# Patient Record
Sex: MALE | Race: WHITE | URBAN - METROPOLITAN AREA
[De-identification: names, ages, dates, MRNs, and addresses within clinical notes are randomized per-mention and may not be internally consistent; named-entity substitution may affect disease eponyms.]

---

## 2019-10-08 ENCOUNTER — OFFICE (OUTPATIENT)
Dept: URBAN - METROPOLITAN AREA PATHOLOGY 4 | Facility: PATHOLOGY | Age: 74
End: 2019-10-08

## 2019-10-08 ENCOUNTER — AMBULATORY SURGICAL CENTER (OUTPATIENT)
Dept: URBAN - METROPOLITAN AREA SURGERY 10 | Facility: SURGERY | Age: 74
End: 2019-10-08
Payer: MEDICARE

## 2019-10-08 DIAGNOSIS — Z80.9 FAMILY HISTORY OF MALIGNANT NEOPLASM, UNSPECIFIED: ICD-10-CM

## 2019-10-08 DIAGNOSIS — D12.4 BENIGN NEOPLASM OF DESCENDING COLON: ICD-10-CM

## 2019-10-08 DIAGNOSIS — Z86.010 PERSONAL HISTORY OF COLONIC POLYPS: ICD-10-CM

## 2019-10-08 DIAGNOSIS — K64.1 SECOND DEGREE HEMORRHOIDS: ICD-10-CM

## 2019-10-08 PROCEDURE — 45385 COLONOSCOPY W/LESION REMOVAL: CPT | Mod: PT | Performed by: INTERNAL MEDICINE

## 2019-10-08 PROCEDURE — 88305 TISSUE EXAM BY PATHOLOGIST: CPT | Performed by: INTERNAL MEDICINE

## 2020-10-01 ENCOUNTER — TELEPHONE (OUTPATIENT)
Dept: ENDOCRINOLOGY | Facility: CLINIC | Age: 75
End: 2020-10-01

## 2020-10-01 NOTE — TELEPHONE ENCOUNTER
PATIENT NEEDS REFILLS ON VICTOZA MEDICATION. HE HAS LESS THEN A MONTH SUPPLY OF MEDICATION. HE NEEDS TO GET VICOTOZA 1.8 AND FOR NOVOLIN 70/30. HE WAS GETTING THEM FOR FREE AND WANTS TO CONTINUE GETTING THEM FOR FREE.  PATIENTS NUMBER [phone]. HE WOULD LIKE A CALL ABOUT THIS MESSAGE.

## 2020-10-06 NOTE — TELEPHONE ENCOUNTER
ash pt. And advised pt. Assist. Paperwork was faxed in on 9/29/2020 and he should be hearing from them soon, he will call [facility] pt. Assit. If he doesn't hear anything soon. KHALIDA

## 2020-10-15 NOTE — TELEPHONE ENCOUNTER
Patient assistance came in today. Notified patient of medications in office. He verbalized understanding and had no further questions.

## 2020-12-22 ENCOUNTER — OFFICE VISIT (OUTPATIENT)
Dept: ENDOCRINOLOGY | Facility: CLINIC | Age: 75
End: 2020-12-22

## 2020-12-22 VITALS
DIASTOLIC BLOOD PRESSURE: 58 MMHG | SYSTOLIC BLOOD PRESSURE: 118 MMHG | WEIGHT: 224.8 LBS | BODY MASS INDEX: 33.3 KG/M2 | TEMPERATURE: 97.7 F | HEIGHT: 69 IN

## 2020-12-22 DIAGNOSIS — I10 BENIGN HYPERTENSION: ICD-10-CM

## 2020-12-22 DIAGNOSIS — E11.21 TYPE 2 DIABETES MELLITUS WITH DIABETIC NEPHROPATHY, WITH LONG-TERM CURRENT USE OF INSULIN (HCC): ICD-10-CM

## 2020-12-22 DIAGNOSIS — E11.649 TYPE 2 DIABETES MELLITUS WITH HYPOGLYCEMIA WITHOUT COMA, WITH LONG-TERM CURRENT USE OF INSULIN (HCC): ICD-10-CM

## 2020-12-22 DIAGNOSIS — Z79.4 TYPE 2 DIABETES MELLITUS WITH DIABETIC NEPHROPATHY, WITH LONG-TERM CURRENT USE OF INSULIN (HCC): ICD-10-CM

## 2020-12-22 DIAGNOSIS — Z79.4 TYPE 2 DIABETES MELLITUS WITH HYPOGLYCEMIA WITHOUT COMA, WITH LONG-TERM CURRENT USE OF INSULIN (HCC): ICD-10-CM

## 2020-12-22 DIAGNOSIS — E11.65 UNCONTROLLED TYPE 2 DIABETES MELLITUS WITH HYPERGLYCEMIA (HCC): Primary | ICD-10-CM

## 2020-12-22 PROBLEM — E78.2 MIXED HYPERLIPIDEMIA: Status: ACTIVE | Noted: 2020-12-22

## 2020-12-22 PROCEDURE — 99214 OFFICE O/P EST MOD 30 MIN: CPT | Performed by: INTERNAL MEDICINE

## 2020-12-22 RX ORDER — MULTIVIT-MIN/IRON/FOLIC ACID/K 18-600-40
CAPSULE ORAL
COMMUNITY

## 2020-12-22 RX ORDER — ROSUVASTATIN CALCIUM 10 MG/1
10 TABLET, COATED ORAL DAILY
COMMUNITY
Start: 2020-12-13

## 2020-12-22 RX ORDER — MONTELUKAST SODIUM 10 MG/1
1 TABLET ORAL DAILY
COMMUNITY
Start: 2020-11-09

## 2020-12-22 RX ORDER — FENOFIBRATE 160 MG/1
1 TABLET ORAL DAILY
COMMUNITY
Start: 2020-09-28

## 2020-12-22 RX ORDER — HYDROCHLOROTHIAZIDE 50 MG/1
50 TABLET ORAL 2 TIMES DAILY
COMMUNITY
End: 2022-03-23

## 2020-12-22 RX ORDER — SEMAGLUTIDE 1.34 MG/ML
1 INJECTION, SOLUTION SUBCUTANEOUS WEEKLY
Qty: 18 ML | Refills: 3 | Status: SHIPPED | OUTPATIENT
Start: 2020-12-22 | End: 2021-02-02 | Stop reason: SDUPTHER

## 2020-12-22 RX ORDER — APIXABAN 5 MG/1
5 TABLET, FILM COATED ORAL 2 TIMES DAILY
COMMUNITY
Start: 2020-09-28

## 2020-12-22 RX ORDER — DOXAZOSIN 8 MG/1
1 TABLET ORAL DAILY
COMMUNITY
Start: 2020-10-27

## 2020-12-22 RX ORDER — NIFEDIPINE 90 MG/1
1 TABLET, EXTENDED RELEASE ORAL DAILY
COMMUNITY
Start: 2020-12-06 | End: 2022-07-05

## 2020-12-22 RX ORDER — METOLAZONE 2.5 MG/1
1 TABLET ORAL EVERY OTHER DAY
COMMUNITY
Start: 2020-12-21

## 2020-12-22 RX ORDER — CARVEDILOL 12.5 MG/1
1 TABLET ORAL 2 TIMES DAILY
COMMUNITY
Start: 2020-11-09

## 2020-12-22 RX ORDER — FLECAINIDE ACETATE 50 MG/1
1 TABLET ORAL
COMMUNITY
Start: 2020-10-14

## 2020-12-22 RX ORDER — BLOOD SUGAR DIAGNOSTIC
STRIP MISCELLANEOUS
COMMUNITY
Start: 2020-09-28 | End: 2022-08-12

## 2020-12-22 RX ORDER — FINASTERIDE 5 MG/1
1 TABLET, FILM COATED ORAL DAILY
COMMUNITY
Start: 2020-12-06

## 2020-12-22 NOTE — ASSESSMENT & PLAN NOTE
Diabetes is worsening.   Continue current treatment regimen.  Diabetes will be reassessed in 3 months.    Recent A1c was 8.1%.  He has been on prednisone recently for bronchitis.    We discussed change from victoza to ozempic.

## 2020-12-22 NOTE — PROGRESS NOTES
"     Office Note      Date: 2020  Patient Name: Timothy Chacon  MRN: [id number]  : 1945    Chief Complaint   Patient presents with   • Follow-up   • Diabetes       History of Present Illness:   Timothy Chacon is a 75 y.o. male who presents for Diabetes type 2. Diagnosed in: . Treated in past with oral agents. Current treatments: premix insulin and victoza. Number of insulin shots per day: 2. Checks blood sugar 2 times a day. Has low blood sugar: occasional. Aspirin use: No - on eliquis. Statin use: Yes. ACE-I/ARB use: No - due to hypotension. Changes in health since last visit: none. Last eye exam .    Subjective      Diabetic Complications:  Eyes: No  Kidneys: Yes -    Feet: No  Heart: No    Diet and Exercise:  Meals per day: 2  Minutes of exercise per week: 0 mins.    Review of Systems:   Review of Systems   Constitutional: Negative.    Cardiovascular: Negative.    Gastrointestinal: Negative.    Endocrine: Negative.        The following portions of the patient's history were reviewed and updated as appropriate: allergies, current medications, past family history, past medical history, past social history, past surgical history and problem list.    Objective       Visit Vitals  /58   Temp 97.7 °F (36.5 °C) (Infrared)   Ht 175.3 cm (69\")   Wt 102 kg (224 lb 12.8 oz)   BMI 33.20 kg/m²       Physical Exam:  Physical Exam  Constitutional:       Appearance: Normal appearance.   Neurological:      Mental Status: He is alert.         Labs:    HbA1c  No results found for: HGBA1C    CMP  No results found for: GLUCOSE, BUN, CREATININE, EGFRIFNONA, EGFRIFAFRI, BCR, K, CO2, CALCIUM, PROTENTOTREF, LABIL2, BILIRUBIN, AST, ALT     Lipid Panel        TSH  No results found for: TSH, FREET4     Hemoglobin A1C        Microalbumin/Creatinine  No results found for: MALBCRERATIO, CREATINIURIN, MICROALBUR        Assessment / Plan      Assessment & Plan:  Problem List Items Addressed This Visit        Cardiovascular and " Mediastinum    Benign hypertension    Current Assessment & Plan     Hypertension is unchanged.  Continue current treatment regimen.  Blood pressure will be reassessed in 3 months.         Relevant Medications    carvedilol (COREG) 12.5 MG tablet    NIFEdipine XL (PROCARDIA XL) 90 MG 24 hr tablet    metOLazone (ZAROXOLYN) 2.5 MG tablet    doxazosin (CARDURA) 8 MG tablet    hydroCHLOROthiazide (HYDRODIURIL) 50 MG tablet       Endocrine    Uncontrolled type 2 diabetes mellitus with hyperglycemia (CMS/[facility]) - Primary    Current Assessment & Plan     Diabetes is worsening.   Continue current treatment regimen.  Diabetes will be reassessed in 3 months.    Recent A1c was 8.1%.  He has been on prednisone recently for bronchitis.    We discussed change from victoza to ozempic.         Relevant Medications    Insulin Aspart Prot & Aspart (NOVOLOG MIX 70/30 FLEXPEN SC)    Semaglutide, 1 MG/DOSE, (Ozempic, 1 MG/DOSE,) 2 MG/1.5ML solution pen-injector    Type 2 diabetes mellitus with hypoglycemia without coma (CMS/[facility])    Relevant Medications    Insulin Aspart Prot & Aspart (NOVOLOG MIX 70/30 FLEXPEN SC)    Semaglutide, 1 MG/DOSE, (Ozempic, 1 MG/DOSE,) 2 MG/1.5ML solution pen-injector    Type 2 diabetes mellitus with diabetic nephropathy, with long-term current use of insulin (CMS/[facility])    Current Assessment & Plan     Continue nephrology follow up.         Relevant Medications    metOLazone (ZAROXOLYN) 2.5 MG tablet    Insulin Aspart Prot & Aspart (NOVOLOG MIX 70/30 FLEXPEN SC)    hydroCHLOROthiazide (HYDRODIURIL) 50 MG tablet    Semaglutide, 1 MG/DOSE, (Ozempic, 1 MG/DOSE,) 2 MG/1.5ML solution pen-injector           Return in about 3 months (around 3/22/2021) for Recheck with A1c.    Zac Fish MD   12/22/2020

## 2021-02-02 RX ORDER — SEMAGLUTIDE 1.34 MG/ML
INJECTION, SOLUTION SUBCUTANEOUS
Qty: 9 ML | Refills: 1 | Status: SHIPPED | OUTPATIENT
Start: 2021-02-02 | End: 2021-02-02 | Stop reason: SDUPTHER

## 2021-02-02 RX ORDER — SEMAGLUTIDE 1.34 MG/ML
INJECTION, SOLUTION SUBCUTANEOUS
Qty: 9 ML | Refills: 1 | Status: SHIPPED | OUTPATIENT
Start: 2021-02-02

## 2021-02-02 NOTE — TELEPHONE ENCOUNTER
Spoke to pt-he is no long on victozia and doesn't need the 70/30 inulin or pen needles.  He will contact [employer] to cancel.  Needs reill on ozempic.  Last visit 12/22/20  Next visit 4/6/21

## 2021-03-08 RX ORDER — BLOOD SUGAR DIAGNOSTIC
STRIP MISCELLANEOUS
Qty: 200 EACH | Refills: 1 | Status: SHIPPED | OUTPATIENT
Start: 2021-03-08 | End: 2022-07-12

## 2021-03-08 RX ORDER — BLOOD-GLUCOSE METER
1 EACH MISCELLANEOUS ONCE
Qty: 1 KIT | Refills: 0 | Status: SHIPPED | OUTPATIENT
Start: 2021-03-08 | End: 2021-03-08

## 2021-03-08 RX ORDER — BLOOD SUGAR DIAGNOSTIC
STRIP MISCELLANEOUS
Qty: 200 EACH | Refills: 1 | Status: SHIPPED | OUTPATIENT
Start: 2021-03-08 | End: 2022-03-23

## 2021-03-08 NOTE — TELEPHONE ENCOUNTER
Patient called and is having issues with his medication. He states that he's tried to order syringes twice but they've been canceled both times, and his wife ordered and got them within two weeks using the same insurance so he wants to know if we could order him Droplet Insulin Syringes, 8ml by 31 gauge 5/16th inch syringes. He needs 200.     He also needs a refill on his One Touch test strips but will need a PA. They said they would cover Accu-check test strips but that doesn't match his meter. He wants to know if we had any samples. Please advise and give him a call back.

## 2021-04-06 ENCOUNTER — TELEPHONE (OUTPATIENT)
Dept: ENDOCRINOLOGY | Facility: CLINIC | Age: 76
End: 2021-04-06

## 2021-04-06 ENCOUNTER — OFFICE VISIT (OUTPATIENT)
Dept: ENDOCRINOLOGY | Facility: CLINIC | Age: 76
End: 2021-04-06

## 2021-04-06 VITALS
BODY MASS INDEX: 43.59 KG/M2 | HEIGHT: 60 IN | SYSTOLIC BLOOD PRESSURE: 120 MMHG | OXYGEN SATURATION: 98 % | TEMPERATURE: 97.7 F | HEART RATE: 70 BPM | DIASTOLIC BLOOD PRESSURE: 50 MMHG | WEIGHT: 222 LBS

## 2021-04-06 DIAGNOSIS — I10 BENIGN HYPERTENSION: ICD-10-CM

## 2021-04-06 DIAGNOSIS — Z79.4 TYPE 2 DIABETES MELLITUS WITH HYPOGLYCEMIA WITHOUT COMA, WITH LONG-TERM CURRENT USE OF INSULIN (HCC): ICD-10-CM

## 2021-04-06 DIAGNOSIS — E78.2 MIXED HYPERLIPIDEMIA: ICD-10-CM

## 2021-04-06 DIAGNOSIS — E11.65 UNCONTROLLED TYPE 2 DIABETES MELLITUS WITH HYPERGLYCEMIA (HCC): Primary | ICD-10-CM

## 2021-04-06 DIAGNOSIS — Z79.4 INSULIN LONG-TERM USE (HCC): ICD-10-CM

## 2021-04-06 DIAGNOSIS — E11.649 TYPE 2 DIABETES MELLITUS WITH HYPOGLYCEMIA WITHOUT COMA, WITH LONG-TERM CURRENT USE OF INSULIN (HCC): ICD-10-CM

## 2021-04-06 PROCEDURE — 99214 OFFICE O/P EST MOD 30 MIN: CPT | Performed by: INTERNAL MEDICINE

## 2021-04-06 RX ORDER — SPIRONOLACTONE 50 MG/1
TABLET, FILM COATED ORAL
COMMUNITY
Start: 2021-02-23 | End: 2022-07-05

## 2021-04-06 NOTE — PROGRESS NOTES
"     Office Note      Date: 2021  Patient Name: Timothy Chacon  MRN: [id number]  : 1945    Chief Complaint   Patient presents with   • Diabetes       History of Present Illness:   Timothy Chacon is a 75 y.o. male who presents for Diabetes type 2. Diagnosed in: . Treated in past with oral agents. Current treatments: premix insulin and ozempic. Number of insulin shots per day: 2. Checks blood sugar 2 times a day. Has low blood sugar: occasional severe. Aspirin use: No - on eliquis. Statin use: Yes. ACE-I/ARB use: No - due to hypotension. Changes in health since last visit: COVID-19 infection - mild symptoms. Last eye exam .    Subjective      Diabetic Complications:  Eyes: No  Kidneys: Yes -    Feet: No  Heart: No    Diet and Exercise:  Meals per day: 2  Minutes of exercise per week: 0 mins.    Review of Systems:   Review of Systems   Constitutional: Negative.    Cardiovascular: Negative.    Gastrointestinal: Negative.    Endocrine: Negative.        The following portions of the patient's history were reviewed and updated as appropriate: allergies, current medications, past family history, past medical history, past social history, past surgical history and problem list.    Objective       Visit Vitals  /50 (BP Location: Right arm, Patient Position: Sitting, Cuff Size: Adult)   Pulse 70   Temp 97.7 °F (36.5 °C) (Infrared)   Ht 152.4 cm (60\")   Wt 101 kg (222 lb)   SpO2 98%   BMI 43.36 kg/m²       Physical Exam:  Physical Exam  Constitutional:       Appearance: Normal appearance.   Neurological:      Mental Status: He is alert.         Labs:    HbA1c  No results found for: HGBA1C    CMP  No results found for: GLUCOSE, BUN, CREATININE, EGFRIFNONA, EGFRIFAFRI, BCR, K, CO2, CALCIUM, PROTENTOTREF, LABIL2, BILIRUBIN, AST, ALT     Lipid Panel        TSH  No results found for: TSH, FREET4     Hemoglobin A1C        Microalbumin/Creatinine  No results found for: MALBCRERATIO, CREATINIURIN, " MICROALBUR        Assessment / Plan      Assessment & Plan:  Diagnoses and all orders for this visit:    1. Uncontrolled type 2 diabetes mellitus with hyperglycemia (CMS/[facility]) (Primary)  Assessment & Plan:  Diabetes is worsening. Last A1c was higher at 9.5% but this was prior to changing from victoza to ozempic.  Continue current treatment regimen.  Diabetes will be reassessed in 3 months.    FSBS show lower fasting FSBS and higher in the evening.  Decrease PM insulin dose by 5u and increase AM dose by 5u.      2. Type 2 diabetes mellitus with hypoglycemia without coma, with long-term current use of insulin (CMS/[facility])  Assessment & Plan:  Decrease PM insulin dose.      3. Benign hypertension  Assessment & Plan:  Hypertension is unchanged.  Continue current treatment regimen.  Blood pressure will be reassessed at the next regular appointment.      4. Mixed hyperlipidemia  Assessment & Plan:  Continue statin.      5. Insulin long-term use (CMS/[facility])      Return in about 3 months (around 7/6/2021) for Recheck with A1c.    Zac Fish MD   04/06/2021

## 2021-04-06 NOTE — ASSESSMENT & PLAN NOTE
Diabetes is worsening. Last A1c was higher at 9.5% but this was prior to changing from victoza to ozempic.  Continue current treatment regimen.  Diabetes will be reassessed in 3 months.    FSBS show lower fasting FSBS and higher in the evening.  Decrease PM insulin dose by 5u and increase AM dose by 5u.

## 2021-04-26 ENCOUNTER — TELEPHONE (OUTPATIENT)
Dept: ENDOCRINOLOGY | Facility: CLINIC | Age: 76
End: 2021-04-26

## 2021-04-26 NOTE — TELEPHONE ENCOUNTER
Spoke with patient.  He is going to have them fax over form to add the ozempic to previous patient assistance paperwork.

## 2021-04-26 NOTE — TELEPHONE ENCOUNTER
PATIENT CALLED REQUESTING TO SPEAK WITH SOMEONE REGARDING PATIENT ASSISTANCE. HE STATES THAT HE HAS SPOKEN WITH [employer]. HE WAS TOLD THAT THEY HAVE SENT NEW PAPERWORK TO THIS OFFICE AND HE NEEDS TO GO OVER SOME OF THE INFORMATION WITH YOU.

## 2021-05-11 ENCOUNTER — TELEPHONE (OUTPATIENT)
Dept: ENDOCRINOLOGY | Facility: CLINIC | Age: 76
End: 2021-05-11

## 2021-05-11 NOTE — TELEPHONE ENCOUNTER
Patient is calling regarding the patient assistance for [employer]. Please give him a call back when able.

## 2021-05-11 NOTE — TELEPHONE ENCOUNTER
PT CALLED. HE STATED HE SPOKE W/ [facility]. THE PT STATED THAT [employer] REQUIRES WE SEND THEM INFORMATION IN REGARDS TO THE PT'S MEDICATIONS, DOSAGE, ETC. PLEASE LOOK INTO THIS. PT SEEMED CONCERNED. THANK YOU

## 2021-06-04 ENCOUNTER — TELEPHONE (OUTPATIENT)
Dept: ENDOCRINOLOGY | Facility: CLINIC | Age: 76
End: 2021-06-04

## 2021-08-04 ENCOUNTER — MEDICATION THERAPY MANAGEMENT (OUTPATIENT)
Dept: ENDOCRINOLOGY | Facility: CLINIC | Age: 76
End: 2021-08-04

## 2021-08-04 ENCOUNTER — OFFICE VISIT (OUTPATIENT)
Dept: ENDOCRINOLOGY | Facility: CLINIC | Age: 76
End: 2021-08-04

## 2021-08-04 VITALS
DIASTOLIC BLOOD PRESSURE: 58 MMHG | SYSTOLIC BLOOD PRESSURE: 116 MMHG | HEIGHT: 69 IN | OXYGEN SATURATION: 96 % | BODY MASS INDEX: 30.07 KG/M2 | WEIGHT: 203 LBS | HEART RATE: 70 BPM

## 2021-08-04 DIAGNOSIS — Z79.4 TYPE 2 DIABETES MELLITUS WITH HYPOGLYCEMIA WITHOUT COMA, WITH LONG-TERM CURRENT USE OF INSULIN (HCC): ICD-10-CM

## 2021-08-04 DIAGNOSIS — E11.649 TYPE 2 DIABETES MELLITUS WITH HYPOGLYCEMIA WITHOUT COMA, WITH LONG-TERM CURRENT USE OF INSULIN (HCC): ICD-10-CM

## 2021-08-04 DIAGNOSIS — E78.2 MIXED HYPERLIPIDEMIA: ICD-10-CM

## 2021-08-04 DIAGNOSIS — E11.65 UNCONTROLLED TYPE 2 DIABETES MELLITUS WITH HYPERGLYCEMIA (HCC): Primary | ICD-10-CM

## 2021-08-04 DIAGNOSIS — I10 BENIGN HYPERTENSION: ICD-10-CM

## 2021-08-04 DIAGNOSIS — Z79.4 INSULIN LONG-TERM USE (HCC): ICD-10-CM

## 2021-08-04 LAB — HBA1C MFR BLD: 8.4 %

## 2021-08-04 PROCEDURE — 99214 OFFICE O/P EST MOD 30 MIN: CPT | Performed by: INTERNAL MEDICINE

## 2021-08-04 NOTE — ASSESSMENT & PLAN NOTE
Diabetes is improving with treatment.  Recent fasting FSBS acceptable to high but presupper FSBS are too high.    Continue current treatment regimen.  But increase AM insulin.  Diabetes will be reassessed in 3 months.    He has been getting meds through patient assistance.

## 2021-08-04 NOTE — PROGRESS NOTES
MTM[facility] Pharmacy Visit [facility] Endocrinology    Timothy Chaocn is a 75 y.o. male with T2DM seen by Endocrinology and assessed by the Medication Management Clinic Pharmacist at [facility]. This was an Initial MTM visit for Timothy Chacon.    Timothy Chacon was introduced to the [facility] and allergies, PMH, and medications were reviewed.    Insurance Coverage/Eligibility:  • [employer] Medicare Part D (AVOB)  • Patient is Eligible for [facility]    Past Medical History:   Diagnosis Date   • Atrial fibrillation (CMS/HCC)    • Benign hypertension    • Family history of colon cancer     Mother and father   • Hypoglycemia due to type 2 diabetes mellitus (CMS/HCC)    • Long term (current) use of insulin (CMS/[facility])    • Mixed hyperlipidemia    • Obesity     body mass index 30+-obesity   • Renal disease     due to type 2 diabetes mellitus-Abstracted from [facility]   • Type 2 diabetes mellitus, uncontrolled (CMS/HCC)      Social History     Socioeconomic History   • Marital status:      Spouse name: Not on file   • Number of children: Not on file   • Years of education: Not on file   • Highest education level: Not on file   Tobacco Use   • Smoking status: Former Smoker     Packs/day: 0.50     Years: 15.00     Pack years: 7.50     Types: Cigarettes     Quit date: 1980     Years since quittin.6   • Smokeless tobacco: Never Used   Vaping Use   • Vaping Use: Never used   Substance and Sexual Activity   • Alcohol use: Not Currently     Comment: seldom   • Drug use: Never   • Sexual activity: Defer       Medication Allergies:  Patient has no known allergies.    Current Medication List:    Current Outpatient Medications:   •  carvedilol (COREG) 12.5 MG tablet, 1 tablet 2 (two) times a day., Disp: , Rfl:   •  Cholecalciferol (Vitamin D) 50 MCG (2000) capsule, Take  by mouth., Disp: , Rfl:   •  doxazosin (CARDURA) 8 MG tablet, 1.5 tablets Daily.,  "Disp: , Rfl:   •  Eliquis 5 MG tablet tablet, Take 5 mg by mouth 2 (Two) Times a Day., Disp: , Rfl:   •  fenofibrate 160 MG tablet, 1 tablet Daily., Disp: , Rfl:   •  finasteride (PROSCAR) 5 MG tablet, 1 tablet Daily., Disp: , Rfl:   •  flecainide (TAMBOCOR) 50 MG tablet, 1 tablet Every Other Day., Disp: , Rfl:   •  glucose blood (Accu-Chek Guide) test strip, Use to test blood sugar twice daily.  Dx E11.65, Disp: 200 each, Rfl: 1  •  hydroCHLOROthiazide (HYDRODIURIL) 50 MG tablet, Take 50 mg by mouth 2 (two) times a day., Disp: , Rfl:   •  insulin NPH-insulin regular (humuLIN 70/30,novoLIN 70/30) (70-30) 100 UNIT/ML injection, Inject 30u sq q AM and 25u sq q PM, Disp: 50 mL, Rfl: 3  •  Insulin Syringe-Needle U-100 (Droplet Insulin Syringe) 31G X 5/16\" 0.5 ML misc, To give insulin twice daily., Disp: 200 each, Rfl: 1  •  metOLazone (ZAROXOLYN) 2.5 MG tablet, 1 tablet Daily., Disp: , Rfl:   •  montelukast (SINGULAIR) 10 MG tablet, 1 tablet Daily., Disp: , Rfl:   •  NIFEdipine XL (PROCARDIA XL) 90 MG 24 hr tablet, 1 tablet Daily., Disp: , Rfl:   •  OneTouch Ultra test strip, , Disp: , Rfl:   •  Pediatric Multivitamins-Fl (MULTI VIT/FL PO), Take 1 tablet by mouth Daily., Disp: , Rfl:   •  rosuvastatin (CRESTOR) 10 MG tablet, Take 10 mg by mouth Daily., Disp: , Rfl:   •  Semaglutide, 1 MG/DOSE, (Ozempic, 1 MG/DOSE,) 2 MG/1.5ML solution pen-injector, Inject 1 mg weekly, Disp: 9 mL, Rfl: 1  •  spironolactone (ALDACTONE) 50 MG tablet, , Disp: , Rfl:     Labs:  BP: (116)/(58) 116/58   Heart Rate:  [70] 70            There were no vitals filed for this visit.  Lab Results   Component Value Date    HGBA1C 8.4 07/06/2021        Drug-Drug Interactions:   · Hypoglycemia Monitoring Recommended D/T Multiple Diabetes Medications  • No Other Clinically Significant DDIs at this time     Medication Assessment:   1. Aspirin - Not Indicated  2. Statin - Currently Taking  3. ACEi/ARB - No d/t Hypotension, Followed by " Cardiology      Medication Education on Specialty Medication:  The patient was provided medication education via verbal and written information on new and/or current target medications. Patient expressed understanding and had no further questions at this time.    · Ozempic  o Discussed the medication's MOA and that it helps you feel full, helps your body release more insulin and helps your body make less sugar after meals.  o Administer by subcutaneous injection into the abdomen, thigh, or upper arm on the same day each week without regard to food. Rotate injection sites weekly if injecting in the same area of the body and use a new needle every time Ozempic is used. Do not mix with other products.   - For each new prefilled pen, prime the needle before the first injection by turning the dose selector to the flow check symbol and injecting into the air (priming is not required for subsequent injections). Once injected, continue to depress the button until the dial has returned to 0 and for an additional 6 seconds. Then, remove the needle and discard in sharps container.  o Unopened pens should be stored in refrigerator, opened pens may be stored in refrigerator or at room temperature for up to 56 days  o If you miss a dose, take it as soon as you remember and then get back on schedule  - If it has been > 5 days, skip that dose and get back on your regular schedule allowing at least 48 hours between 2 doses.   - Never double up doses to make up for a missed dose  o Nausea, vomiting, and diarrhea are msot common when first starting Ozempic, but they should decrease over time  o Make sure to eat smaller meals throughout the day versus larger meals and this should help with GI symptoms as well.    Assessment & Plan:  1. Provider Changes This Visit: No changes to target meds. Increase Novolin 70/30 to 30u in the morning and 25u in evening. Discussed this change with patient, who feels comfortable with it and had no  additional questions or concerns.   2. Therapy Modifications Recommended: None at This Time   3. Provided contact information for the pharmacy team and discussed [facility] services available to patient, including: monitoring of refills, prior authorizations, and copay coupon cards, as applicable, as well as curb-side pick-up or mail-order as needed.   4. Patient not converted to [facility] - Would like to continue with PAPs through  at this time for Ozempic as he is getting this medication for $0.  Discussed with patient that if PAP status changes or we need to do any benefits investigations further to let us know.       Davida Washington, PharmD  8/4/2021  15:57 EDT

## 2021-08-04 NOTE — PROGRESS NOTES
"     Office Note      Date: 2021  Patient Name: Timothy Chacon  MRN: [id number]  : 1945    Chief Complaint   Patient presents with   • Diabetes       History of Present Illness:   Timothy Chacon is a 75 y.o. male who presents for Diabetes type 2. Diagnosed in: . Treated in past with oral agents. Current treatments: premix insulin and ozempic. Number of insulin shots per day: 2. Checks blood sugar 2 times a day. Has low blood sugar: occasional severe. Aspirin use: No - on eliquis. Statin use: Yes. ACE-I/ARB use: No - due to hypotension. Changes in health since last visit: none. Last eye exam .    Subjective      Diabetic Complications:  Eyes: No  Kidneys: Yes -    Feet: No  Heart: No    Diet and Exercise:  Meals per day: 2  Minutes of exercise per week: 0 mins.    Review of Systems:   Review of Systems   Constitutional: Negative.    Cardiovascular: Negative.    Gastrointestinal: Negative.    Endocrine: Negative.        The following portions of the patient's history were reviewed and updated as appropriate: allergies, current medications, past family history, past medical history, past social history, past surgical history and problem list.    Objective       Visit Vitals  /58   Pulse 70   Ht 175.3 cm (69\")   Wt 92.1 kg (203 lb)   SpO2 96%   BMI 29.98 kg/m²       Physical Exam:  Physical Exam  Constitutional:       Appearance: Normal appearance.   Neurological:      Mental Status: He is alert.         Labs:    HbA1c  Lab Results   Component Value Date    HGBA1C 8.4 2021       CMP  No results found for: GLUCOSE, BUN, CREATININE, EGFRIFNONA, EGFRIFAFRI, BCR, K, CO2, CALCIUM, PROTENTOTREF, LABIL2, BILIRUBIN, AST, ALT     Lipid Panel        TSH  No results found for: TSH, FREET4     Hemoglobin A1C  Lab Results   Component Value Date    HGBA1C 8.4 2021        Microalbumin/Creatinine  No results found for: MALBCRERATIO, CREATINIURIN, MICROALBUR        Assessment / Plan      Assessment & " Plan:  Diagnoses and all orders for this visit:    1. Uncontrolled type 2 diabetes mellitus with hyperglycemia (CMS/[facility]) (Primary)  Assessment & Plan:  Diabetes is improving with treatment.  Recent fasting FSBS acceptable to high but presupper FSBS are too high.    Continue current treatment regimen.  But increase AM insulin.  Diabetes will be reassessed in 3 months.    He has been getting meds through patient assistance.      2. Type 2 diabetes mellitus with hypoglycemia without coma, with long-term current use of insulin (CMS/[facility])  Assessment & Plan:  Some lows if he skips lunch.  We discussed eating consistently around lunchtime.      3. Benign hypertension  Assessment & Plan:  Hypertension is unchanged.  Continue current treatment regimen.  Blood pressure will be reassessed at the next regular appointment.      4. Mixed hyperlipidemia  Assessment & Plan:  Continue statin and fibrate.      5. Insulin long-term use (CMS/[facility])    Other orders  -     insulin NPH-insulin regular (humuLIN 70/30,novoLIN 70/30) (70-30) 100 UNIT/ML injection; Inject 30u sq q AM and 25u sq q PM  Dispense: 50 mL; Refill: 3      Return in about 3 months (around 11/4/2021) for Recheck with A1c, CMP, lipids, TSH.    Zac Fish MD   08/04/2021   Undermining Type: Entire Wound

## 2021-08-12 RX ORDER — HUMAN INSULIN 100 [USP'U]/ML
INJECTION, SUSPENSION SUBCUTANEOUS
Qty: 60 ML | Refills: 3 | OUTPATIENT
Start: 2021-08-12 | End: 2022-07-12

## 2021-11-03 ENCOUNTER — SPECIALTY PHARMACY (OUTPATIENT)
Dept: ENDOCRINOLOGY | Facility: CLINIC | Age: 76
End: 2021-11-03

## 2021-11-03 NOTE — PROGRESS NOTES
Patient declined filling specialty medication at [facility] and/or enrollment in the Patient Management Program.

## 2021-11-15 ENCOUNTER — TELEPHONE (OUTPATIENT)
Dept: ENDOCRINOLOGY | Facility: CLINIC | Age: 76
End: 2021-11-15

## 2021-11-15 NOTE — TELEPHONE ENCOUNTER
PATIENT IS CALLING TO SEE IF HIS OZEMPIC MEDICATION HAS ARRIVED TO THE OFFICE. IT COMES FROM [facility]. HE IS ALMOST OUT OF MEDICATION AND NEEDS TO  MORE OZEMPIC. PHONE NUMBER [phone]

## 2021-11-16 NOTE — TELEPHONE ENCOUNTER
PATIENT SPOKE WITH PATIENT ASSISTANCE PROGRAM. HE WAS TOLD HE NEEDS TO FILL OUT NEW SET OF PAPER WORK FOR ASSISTANCE AND WILL NEED US TO SIGN IT. HE PLANS TO BRING IT TO US TOMORROW TO FILL OUT OUR PORTION. PATIENT SEES DR. JULIO ALTAMIRANO. PHONE NUMBER [phone]. THIS IS FOR HIS OZEMPIC MEDICATION. HE ONLY HAS 1-2 SHOTS LEFT OF WHAT HE HAS HE MAY NEED SAMPLES UNTIL THIS PAPER WORK IS PROCESSED AND SHIPPED.

## 2021-11-23 ENCOUNTER — TELEPHONE (OUTPATIENT)
Dept: ENDOCRINOLOGY | Facility: CLINIC | Age: 76
End: 2021-11-23

## 2021-11-23 DIAGNOSIS — E11.65 UNCONTROLLED TYPE 2 DIABETES MELLITUS WITH HYPERGLYCEMIA (HCC): Primary | ICD-10-CM

## 2021-12-10 ENCOUNTER — LAB (OUTPATIENT)
Dept: ENDOCRINOLOGY | Facility: CLINIC | Age: 76
End: 2021-12-10

## 2021-12-10 ENCOUNTER — LAB (OUTPATIENT)
Dept: LAB | Facility: HOSPITAL | Age: 76
End: 2021-12-10

## 2021-12-10 DIAGNOSIS — E11.65 UNCONTROLLED TYPE 2 DIABETES MELLITUS WITH HYPERGLYCEMIA (HCC): ICD-10-CM

## 2021-12-10 LAB — HBA1C MFR BLD: 10.67 % (ref 4.8–5.6)

## 2021-12-10 PROCEDURE — 83036 HEMOGLOBIN GLYCOSYLATED A1C: CPT

## 2021-12-10 PROCEDURE — 80061 LIPID PANEL: CPT

## 2021-12-10 PROCEDURE — 80053 COMPREHEN METABOLIC PANEL: CPT

## 2021-12-10 PROCEDURE — 84443 ASSAY THYROID STIM HORMONE: CPT

## 2021-12-11 LAB
ALBUMIN SERPL-MCNC: 4.2 G/DL (ref 3.5–5.2)
ALBUMIN/GLOB SERPL: 1.8 G/DL
ALP SERPL-CCNC: 42 U/L (ref 39–117)
ALT SERPL W P-5'-P-CCNC: 10 U/L (ref 1–41)
ANION GAP SERPL CALCULATED.3IONS-SCNC: 8.7 MMOL/L (ref 5–15)
AST SERPL-CCNC: 18 U/L (ref 1–40)
BILIRUB SERPL-MCNC: 0.3 MG/DL (ref 0–1.2)
BUN SERPL-MCNC: 32 MG/DL (ref 8–23)
BUN/CREAT SERPL: 16.9 (ref 7–25)
CALCIUM SPEC-SCNC: 9.9 MG/DL (ref 8.6–10.5)
CHLORIDE SERPL-SCNC: 107 MMOL/L (ref 98–107)
CHOLEST SERPL-MCNC: 130 MG/DL (ref 0–200)
CO2 SERPL-SCNC: 25.3 MMOL/L (ref 22–29)
CREAT SERPL-MCNC: 1.89 MG/DL (ref 0.76–1.27)
GFR SERPL CREATININE-BSD FRML MDRD: 35 ML/MIN/1.73
GLOBULIN UR ELPH-MCNC: 2.4 GM/DL
GLUCOSE SERPL-MCNC: 44 MG/DL (ref 65–99)
HDLC SERPL-MCNC: 36 MG/DL (ref 40–60)
LDLC SERPL CALC-MCNC: 76 MG/DL (ref 0–100)
LDLC/HDLC SERPL: 2.09 {RATIO}
POTASSIUM SERPL-SCNC: 4.8 MMOL/L (ref 3.5–5.2)
PROT SERPL-MCNC: 6.6 G/DL (ref 6–8.5)
SODIUM SERPL-SCNC: 141 MMOL/L (ref 136–145)
TRIGL SERPL-MCNC: 93 MG/DL (ref 0–150)
TSH SERPL DL<=0.05 MIU/L-ACNC: 1.89 UIU/ML (ref 0.27–4.2)
VLDLC SERPL-MCNC: 18 MG/DL (ref 5–40)

## 2021-12-13 ENCOUNTER — DOCUMENTATION (OUTPATIENT)
Dept: ENDOCRINOLOGY | Facility: CLINIC | Age: 76
End: 2021-12-13

## 2021-12-13 NOTE — PROGRESS NOTES
Specialty Pharmacy Patient Management Program  Endocrinology Benefits Investigation      Timothy is seen by a [facility] Endocrinology provider and is currently taking a target specialty medication.  The patient IS INELIGIBLE for enrollment in the Endocrinology Patient Management Program offered by [facility] at this time.     Reason: Other: Patient Obtains Specialty Medications via PAP.     Keiry MeraD, [facility] Center[facility]  Clinical Specialty Pharmacist, Endocrinology  1/23/2023  12:02 EST

## 2021-12-14 ENCOUNTER — OFFICE VISIT (OUTPATIENT)
Dept: ENDOCRINOLOGY | Facility: CLINIC | Age: 76
End: 2021-12-14

## 2021-12-14 VITALS
BODY MASS INDEX: 30.36 KG/M2 | DIASTOLIC BLOOD PRESSURE: 82 MMHG | WEIGHT: 205 LBS | HEIGHT: 69 IN | SYSTOLIC BLOOD PRESSURE: 122 MMHG

## 2021-12-14 DIAGNOSIS — Z79.4 TYPE 2 DIABETES MELLITUS WITH HYPOGLYCEMIA WITHOUT COMA, WITH LONG-TERM CURRENT USE OF INSULIN (HCC): ICD-10-CM

## 2021-12-14 DIAGNOSIS — E11.649 TYPE 2 DIABETES MELLITUS WITH HYPOGLYCEMIA WITHOUT COMA, WITH LONG-TERM CURRENT USE OF INSULIN (HCC): ICD-10-CM

## 2021-12-14 DIAGNOSIS — I10 BENIGN HYPERTENSION: ICD-10-CM

## 2021-12-14 DIAGNOSIS — E11.65 UNCONTROLLED TYPE 2 DIABETES MELLITUS WITH HYPERGLYCEMIA (HCC): Primary | ICD-10-CM

## 2021-12-14 DIAGNOSIS — E78.2 MIXED HYPERLIPIDEMIA: ICD-10-CM

## 2021-12-14 DIAGNOSIS — Z79.4 INSULIN LONG-TERM USE (HCC): ICD-10-CM

## 2021-12-14 PROCEDURE — 99214 OFFICE O/P EST MOD 30 MIN: CPT | Performed by: INTERNAL MEDICINE

## 2021-12-14 NOTE — ASSESSMENT & PLAN NOTE
Diabetes is worsening. He reports recent FSBS have been better though. He is working on diet adjustments.  We are limited by hypoglycemia and CRI in terms of meds.  Also cost of meds has been an issue.  Diabetes will be reassessed in 3 months.

## 2021-12-14 NOTE — PROGRESS NOTES
"     Office Note      Date: 2021  Patient Name: Timothy Chacon  MRN: [id number]  : 1945    Chief Complaint   Patient presents with   • Diabetes       History of Present Illness:   Timothy Chacon is a 76 y.o. male who presents for Diabetes type 2. Diagnosed in: . Treated in past with oral agents. Current treatments: premix insulin and ozempic. Number of insulin shots per day: 2. Checks blood sugar 2 times a day. Has low blood sugar: occasional severe. Aspirin use: No - on eliquis. Statin use: Yes. ACE-I/ARB use: No - due to hypotension. Changes in health since last visit: none. Last eye exam 2021.    Subjective      Diabetic Complications:  Eyes: Yes - BDR  Kidneys: Yes -    Feet: No  Heart: No    Diet and Exercise:  Meals per day: 2  Minutes of exercise per week: 0 mins.    Review of Systems:   Review of Systems   Constitutional: Negative.    Cardiovascular: Negative.    Gastrointestinal: Negative.    Endocrine: Negative.        The following portions of the patient's history were reviewed and updated as appropriate: allergies, current medications, past family history, past medical history, past social history, past surgical history and problem list.    Objective       Visit Vitals  /82 (BP Location: Left arm, Patient Position: Sitting, Cuff Size: Adult)   Ht 175.3 cm (69\")   Wt 93 kg (205 lb)   BMI 30.27 kg/m²       Physical Exam:  Physical Exam  Constitutional:       Appearance: Normal appearance.   Cardiovascular:      Pulses:           Dorsalis pedis pulses are 2+ on the right side and 2+ on the left side.        Posterior tibial pulses are 2+ on the right side and 2+ on the left side.   Feet:      Right foot:      Protective Sensation: 5 sites tested. 5 sites sensed.      Skin integrity: Skin integrity normal.      Toenail Condition: Right toenails are abnormally thick. Fungal disease present.     Left foot:      Protective Sensation: 5 sites tested. 5 sites sensed.      Skin integrity: Skin " integrity normal.      Toenail Condition: Left toenails are abnormally thick. Fungal disease present.  Neurological:      Mental Status: He is alert.         Labs:    HbA1c  Lab Results   Component Value Date    HGBA1C 10.67 (H) 12/10/2021       CMP  Lab Results   Component Value Date    GLUCOSE 44 (C) 12/10/2021    BUN 32 (H) 12/10/2021    CREATININE 1.89 (H) 12/10/2021    EGFRIFNONA 35 (L) 12/10/2021    BCR 16.9 12/10/2021    K 4.8 12/10/2021    CO2 25.3 12/10/2021    CALCIUM 9.9 12/10/2021    AST 18 12/10/2021    ALT 10 12/10/2021        Lipid Panel  Lab Results   Component Value Date    HDL 36 (L) 12/10/2021    LDL 76 12/10/2021    TRIG 93 12/10/2021        TSH  Lab Results   Component Value Date    TSH 1.890 12/10/2021        Hemoglobin A1C  Lab Results   Component Value Date    HGBA1C 10.67 (H) 12/10/2021        Microalbumin/Creatinine  No results found for: MALBCRERATIO, CREATINIURIN, MICROALBUR        Assessment / Plan      Assessment & Plan:  Diagnoses and all orders for this visit:    1. Uncontrolled type 2 diabetes mellitus with hyperglycemia (HCC) (Primary)  Assessment & Plan:  Diabetes is worsening. He reports recent FSBS have been better though. He is working on diet adjustments.  We are limited by hypoglycemia and CRI in terms of meds.  Also cost of meds has been an issue.  Diabetes will be reassessed in 3 months.      2. Type 2 diabetes mellitus with hypoglycemia without coma, with long-term current use of insulin (HCC)  Assessment & Plan:  Continue FSBS.      3. Benign hypertension  Assessment & Plan:  Hypertension is unchanged.  Continue current treatment regimen.  Blood pressure will be reassessed at the next regular appointment.      4. Mixed hyperlipidemia  Assessment & Plan:  Recent lipids okay.  Continue statin and fenofibrate.      5. Insulin long-term use (HCC)      Return in about 3 months (around 3/14/2022) for Recheck with A1c.    Zac Fish MD   12/14/2021

## 2022-02-01 ENCOUNTER — TELEPHONE (OUTPATIENT)
Dept: ENDOCRINOLOGY | Facility: CLINIC | Age: 77
End: 2022-02-01

## 2022-02-01 NOTE — TELEPHONE ENCOUNTER
PATIENT WOULD LIKE A CALL BACK TO DISCUSS WHAT THERESE NEEDS FROM US. HE STATES HES BEEN APPROVED AND RECEIVED A LETTER FROM THEN NEEDING A LETTER WITH THE NOVOLIN 70/30 AND SIGNATURE. HE NEEDS TO SEE WHAT WE CAN DO ON OUR END TO GET THIS TAKEN CARE OF SO HE CAN RECEIVED MEDICATION. PHONE [phone]

## 2022-02-14 ENCOUNTER — TELEPHONE (OUTPATIENT)
Dept: ENDOCRINOLOGY | Facility: CLINIC | Age: 77
End: 2022-02-14

## 2022-02-14 NOTE — TELEPHONE ENCOUNTER
[facility]    STATES DIABETIC FOOT EXAM IS NOT SIGNED     PLEASE SIGN AND FAX [phone]

## 2022-02-16 ENCOUNTER — TELEPHONE (OUTPATIENT)
Dept: ENDOCRINOLOGY | Facility: CLINIC | Age: 77
End: 2022-02-16

## 2022-02-25 ENCOUNTER — TELEPHONE (OUTPATIENT)
Dept: ENDOCRINOLOGY | Facility: CLINIC | Age: 77
End: 2022-02-25

## 2022-03-14 ENCOUNTER — TELEPHONE (OUTPATIENT)
Dept: ENDOCRINOLOGY | Facility: CLINIC | Age: 77
End: 2022-03-14

## 2022-03-23 ENCOUNTER — OFFICE VISIT (OUTPATIENT)
Dept: ENDOCRINOLOGY | Facility: CLINIC | Age: 77
End: 2022-03-23

## 2022-03-23 VITALS
HEART RATE: 65 BPM | BODY MASS INDEX: 29.53 KG/M2 | HEIGHT: 69 IN | WEIGHT: 199.4 LBS | OXYGEN SATURATION: 98 % | SYSTOLIC BLOOD PRESSURE: 124 MMHG | DIASTOLIC BLOOD PRESSURE: 60 MMHG | TEMPERATURE: 96.9 F

## 2022-03-23 DIAGNOSIS — Z79.4 TYPE 2 DIABETES MELLITUS WITH HYPERGLYCEMIA, WITH LONG-TERM CURRENT USE OF INSULIN: Primary | Chronic | ICD-10-CM

## 2022-03-23 DIAGNOSIS — E11.65 TYPE 2 DIABETES MELLITUS WITH HYPERGLYCEMIA, WITH LONG-TERM CURRENT USE OF INSULIN: Primary | Chronic | ICD-10-CM

## 2022-03-23 PROCEDURE — 99213 OFFICE O/P EST LOW 20 MIN: CPT | Performed by: PHYSICIAN ASSISTANT

## 2022-06-03 ENCOUNTER — TELEPHONE (OUTPATIENT)
Dept: ENDOCRINOLOGY | Facility: CLINIC | Age: 77
End: 2022-06-03

## 2022-07-05 ENCOUNTER — OFFICE VISIT (OUTPATIENT)
Dept: ENDOCRINOLOGY | Facility: CLINIC | Age: 77
End: 2022-07-05

## 2022-07-05 VITALS
BODY MASS INDEX: 29.18 KG/M2 | SYSTOLIC BLOOD PRESSURE: 124 MMHG | HEIGHT: 69 IN | DIASTOLIC BLOOD PRESSURE: 58 MMHG | HEART RATE: 67 BPM | WEIGHT: 197 LBS | OXYGEN SATURATION: 99 %

## 2022-07-05 DIAGNOSIS — N18.32 STAGE 3B CHRONIC KIDNEY DISEASE: ICD-10-CM

## 2022-07-05 DIAGNOSIS — E11.649 TYPE 2 DIABETES MELLITUS WITH HYPOGLYCEMIA WITHOUT COMA, WITH LONG-TERM CURRENT USE OF INSULIN: ICD-10-CM

## 2022-07-05 DIAGNOSIS — Z79.4 TYPE 2 DIABETES MELLITUS WITH HYPERGLYCEMIA, WITH LONG-TERM CURRENT USE OF INSULIN: Primary | Chronic | ICD-10-CM

## 2022-07-05 DIAGNOSIS — E11.65 TYPE 2 DIABETES MELLITUS WITH HYPERGLYCEMIA, WITH LONG-TERM CURRENT USE OF INSULIN: Primary | Chronic | ICD-10-CM

## 2022-07-05 DIAGNOSIS — Z79.4 TYPE 2 DIABETES MELLITUS WITH HYPOGLYCEMIA WITHOUT COMA, WITH LONG-TERM CURRENT USE OF INSULIN: ICD-10-CM

## 2022-07-05 LAB
EXPIRATION DATE: NORMAL
EXPIRATION DATE: NORMAL
GLUCOSE BLDC GLUCOMTR-MCNC: 87 MG/DL (ref 70–130)
HBA1C MFR BLD: 10.1 %
Lab: NORMAL
Lab: NORMAL

## 2022-07-05 PROCEDURE — 82947 ASSAY GLUCOSE BLOOD QUANT: CPT | Performed by: PHYSICIAN ASSISTANT

## 2022-07-05 PROCEDURE — 83036 HEMOGLOBIN GLYCOSYLATED A1C: CPT | Performed by: PHYSICIAN ASSISTANT

## 2022-07-05 PROCEDURE — 3046F HEMOGLOBIN A1C LEVEL >9.0%: CPT | Performed by: PHYSICIAN ASSISTANT

## 2022-07-05 PROCEDURE — 99214 OFFICE O/P EST MOD 30 MIN: CPT | Performed by: PHYSICIAN ASSISTANT

## 2022-07-05 RX ORDER — NIFEDIPINE 30 MG/1
30 TABLET, EXTENDED RELEASE ORAL DAILY
COMMUNITY
Start: 2022-06-25

## 2022-07-05 RX ORDER — KETOROLAC TROMETHAMINE 5 MG/ML
SOLUTION OPHTHALMIC
COMMUNITY
Start: 2022-05-30

## 2022-07-05 RX ORDER — OFLOXACIN 3 MG/ML
SOLUTION/ DROPS OPHTHALMIC
COMMUNITY
Start: 2022-05-30 | End: 2022-11-08

## 2022-07-05 RX ORDER — PREDNISOLONE ACETATE 10 MG/ML
SUSPENSION/ DROPS OPHTHALMIC
COMMUNITY
Start: 2022-05-30

## 2022-07-05 NOTE — PROGRESS NOTES
Office Note      Date: 2022  Patient Name: Timothy Chacon  MRN: [id number]  : 1945    Chief Complaint   Patient presents with   • Diabetes       History of Present Illness:   Timothy Chacon is a 76 y.o. male who presents today for follow up on type 2 diabetes.  Diabetes was diagnosed in .  Known diabetic complications: nephropathy and retinopathy.  History of severe hypoglycemia requiring EMS.  Current diabetic medications include Novolin 70/30, Ozempic, and Farxiga.  He reports Farxiga was added by PCP about 6 weeks ago.  He reports increased urination, but tolerating well otherwise.  He is testing BG 2 times per day.  Blood sugars are highly variable from 53 to over 400.  He denies any severe hypoglycemia requiring assistance from others.  Meal times are variable.  He is not consistently taking insulin before meals nor always eating after taking insulin.  He is adjusting dose based on blood sugar.  He sleeps later in the summer since he is not taking granddaughter to school.  Feet: No sores.  Foot exam up to date.  Eye exam current (within one year): yes, 2021.  ACE inhibitor/ARB: No.  Statin: Yes, rosuvastatin.  CKD followed by nephrology, Dr. Elissa Albarran.  He reports next appointment in 2 weeks.  He reports being under a lot of stress.    Blood sugar dropped to 48 while in the office.  He was given carbohydrate snack.  Blood sugar was up to 71 prior to leaving office.  He was also heading to eat lunch after appointment.    Subjective      Review of Systems:   Review of Systems   Constitutional: Negative.    Cardiovascular: Negative.    Gastrointestinal: Negative.    Endocrine: Negative.    Genitourinary: Positive for frequency.       Past Medical History:   Diagnosis Date   • Atrial fibrillation (HCC)    • Benign hypertension    • Family history of colon cancer     Mother and father   • Hypoglycemia due to type 2 diabetes mellitus (HCC)    • Mixed hyperlipidemia    • Obesity    • Renal disease      "due to type 2 diabetes mellitus   • Type 2 diabetes mellitus, uncontrolled       Past Surgical History:   Procedure Laterality Date   • CATARACT EXTRACTION Bilateral    • COLECTOMY PARTIAL / TOTAL      with anastomosis   • COLONOSCOPY W/ POLYPECTOMY     • INGUINAL HERNIA REPAIR         The following portions of the patient's history were reviewed and updated as appropriate: allergies, current medications, past family history, past medical history, past social history, past surgical history and problem list.    Objective     Vitals:    07/05/22 1133   BP: 124/58   Pulse: 67   SpO2: 99%   Weight: 89.4 kg (197 lb)   Height: 175.3 cm (69\")   PainSc: 0-No pain   Body mass index is 29.09 kg/m².    Physical Exam  Vitals reviewed.   Constitutional:       General: He is not in acute distress.  Neurological:      Mental Status: He is alert and oriented to person, place, and time.   Psychiatric:         Mood and Affect: Affect normal.         HEMOGLOBIN A1C  Lab Results   Component Value Date    HGBA1C 10.1 07/05/2022    HGBA1C 10.67 (H) 12/10/2021    HGBA1C 8.4 07/06/2021     GLUCOSE  Lab Results   Component Value Date    POCGLU 87 07/05/2022     CMP  Lab Results   Component Value Date    GLUCOSE 44 (C) 12/10/2021    BUN 32 (H) 12/10/2021    CREATININE 1.89 (H) 12/10/2021    EGFRIFNONA 35 (L) 12/10/2021    BCR 16.9 12/10/2021     12/10/2021    K 4.8 12/10/2021    CO2 25.3 12/10/2021    CALCIUM 9.9 12/10/2021    ALBUMIN 4.20 12/10/2021    BILITOT 0.3 12/10/2021    ALKPHOS 42 12/10/2021    AST 18 12/10/2021    ALT 10 12/10/2021     LIPID PANEL  Lab Results   Component Value Date    CHOL 130 12/10/2021    TRIG 93 12/10/2021    HDL 36 (L) 12/10/2021    LDL 76 12/10/2021     URINE MICROALBUMIN/CREATININE RATIO  No results found for: MALBCRERATIO  THYROID  Lab Results   Component Value Date    TSH 1.890 12/10/2021       Current Outpatient Medications   Medication Instructions   • carvedilol (COREG) 12.5 MG tablet 1 tablet, 2 " "times daily   • Cholecalciferol (Vitamin D) 50 MCG (2000 UT) capsule Oral   • doxazosin (CARDURA) 8 MG tablet 1 tablet, Daily   • Eliquis 5 mg, Oral, 2 Times Daily   • fenofibrate 160 MG tablet 1 tablet, Daily   • finasteride (PROSCAR) 5 MG tablet 1 tablet, Daily   • flecainide (TAMBOCOR) 50 MG tablet 1 tablet, 1 in am, 1 in pm   • insulin NPH-insulin regular (NovoLIN 70/30) (70-30) 100 UNIT/ML injection Inject 30 units am, 25 units pm, take extra as needed for high glucose max daily dose 60 units   • Insulin Syringe-Needle U-100 (Droplet Insulin Syringe) 31G X 5/16\" 0.5 ML misc To give insulin twice daily.   • ketorolac (ACULAR) 0.5 % ophthalmic solution PLEASE SEE ATTACHED FOR DETAILED DIRECTIONS   • metOLazone (ZAROXOLYN) 2.5 MG tablet 1 tablet, Every Other Day   • montelukast (SINGULAIR) 10 MG tablet 1 tablet, Daily   • NIFEdipine XL (PROCARDIA XL) 60 MG 24 hr tablet Daily   • ofloxacin (OCUFLOX) 0.3 % ophthalmic solution INSTILL 1 DROP INTO OPERATIVE EYE 4 TIMES A DAY FOR 7 DAYS   • OneTouch Ultra test strip No dose, route, or frequency recorded.   • prednisoLONE acetate (PRED FORTE) 1 % ophthalmic suspension INSTILL 1 DORP INTO OPERATIVE EYE 4 TIMES A DAY FOR 7 DAYS, THEN DECREASE TO TWICE DIALY FOR 14 DAYS   • rosuvastatin (CRESTOR) 10 mg, Oral, Daily   • Semaglutide, 1 MG/DOSE, (Ozempic, 1 MG/DOSE,) 2 MG/1.5ML solution pen-injector Inject 1 mg weekly       Assessment / Plan      Assessment & Plan:  1. Type 2 diabetes mellitus with hyperglycemia, with long-term current use of insulin ([facility])  A1c remains well above goal.  We discussed the importance of timing insulin with meals and keeping a regular meal schedule, especially with the mix insulin.  Recommend changing Novolin 70/30 to basal/bolus for more flexibility.  Continue Ozempic and Farxiga.  He should still qualify [facility] patient assistance with the new insulins (Tresiba and Novolog).  Will complete new forms for patient assistance.  Once he " receives new insulin, will schedule for him to meet with diabetes educators.  We discussed CGM.  Unfortunately, he does not qualify for Medicare coverage at this time.  Medicare coverage for CGM requires 3 or more insulin injections daily, to be monitoring blood sugar 4 times per day, and frequently adjusting insulin based on glucose readings.  Plan to apply for coverage once he qualifies.  - POC Glycosylated Hemoglobin (Hb A1C)  - POC Glucose, Blood    2. Type 2 diabetes mellitus with hypoglycemia without coma, with long-term current use of insulin ([facility])  Reviewed timing of insulin with meals, importance of eating after taking injections.    3. Stage 3b chronic kidney disease ([facility])  Encouraged good blood sugar control.  BP okay.  Continue Farxiga.  He has appointment coming up with nephrology.        Return in about 3 months (around 10/5/2022) for next scheduled follow up. He was advised to contact the office with any interval questions or concerns.    SHERWIN Dc  Endocrinology  07/05/2022

## 2022-07-06 ENCOUNTER — TELEPHONE (OUTPATIENT)
Dept: ENDOCRINOLOGY | Facility: CLINIC | Age: 77
End: 2022-07-06

## 2022-07-07 NOTE — TELEPHONE ENCOUNTER
I would like to change his insulin from Novolin 70/30 to Tresiba and Novolog.  He is receiving the 70/30 through [employer] patient assistance.  Do we just need to complete new forms and fax to [employer]?  I wasn't sure if he would need to sign any forms again.

## 2022-07-12 DIAGNOSIS — Z79.4 TYPE 2 DIABETES MELLITUS WITH HYPERGLYCEMIA, WITH LONG-TERM CURRENT USE OF INSULIN: Primary | ICD-10-CM

## 2022-07-12 DIAGNOSIS — E11.65 TYPE 2 DIABETES MELLITUS WITH HYPERGLYCEMIA, WITH LONG-TERM CURRENT USE OF INSULIN: Primary | ICD-10-CM

## 2022-07-12 RX ORDER — INSULIN DEGLUDEC INJECTION 100 U/ML
INJECTION, SOLUTION SUBCUTANEOUS
Start: 2022-07-12 | End: 2022-07-13

## 2022-07-12 RX ORDER — PEN NEEDLE, DIABETIC 32 GX 1/6"
NEEDLE, DISPOSABLE MISCELLANEOUS
Start: 2022-07-12

## 2022-07-12 RX ORDER — INSULIN ASPART 100 [IU]/ML
INJECTION, SOLUTION INTRAVENOUS; SUBCUTANEOUS
Start: 2022-07-12 | End: 2022-07-13

## 2022-07-13 ENCOUNTER — TELEPHONE (OUTPATIENT)
Dept: ENDOCRINOLOGY | Facility: CLINIC | Age: 77
End: 2022-07-13

## 2022-07-13 DIAGNOSIS — Z79.4 TYPE 2 DIABETES MELLITUS WITH HYPERGLYCEMIA, WITH LONG-TERM CURRENT USE OF INSULIN: ICD-10-CM

## 2022-07-13 DIAGNOSIS — E11.65 TYPE 2 DIABETES MELLITUS WITH HYPERGLYCEMIA, WITH LONG-TERM CURRENT USE OF INSULIN: ICD-10-CM

## 2022-07-13 RX ORDER — INSULIN DEGLUDEC INJECTION 100 U/ML
INJECTION, SOLUTION SUBCUTANEOUS
Start: 2022-07-13

## 2022-07-13 RX ORDER — INSULIN ASPART 100 [IU]/ML
INJECTION, SOLUTION INTRAVENOUS; SUBCUTANEOUS
Start: 2022-07-13

## 2022-08-08 ENCOUNTER — TELEPHONE (OUTPATIENT)
Dept: ENDOCRINOLOGY | Facility: CLINIC | Age: 77
End: 2022-08-08

## 2022-08-10 ENCOUNTER — TELEPHONE (OUTPATIENT)
Dept: ENDOCRINOLOGY | Facility: CLINIC | Age: 77
End: 2022-08-10

## 2022-08-12 ENCOUNTER — OFFICE VISIT (OUTPATIENT)
Dept: ENDOCRINOLOGY | Facility: CLINIC | Age: 77
End: 2022-08-12

## 2022-08-12 VITALS
DIASTOLIC BLOOD PRESSURE: 54 MMHG | WEIGHT: 197.3 LBS | OXYGEN SATURATION: 98 % | BODY MASS INDEX: 29.22 KG/M2 | HEART RATE: 70 BPM | HEIGHT: 69 IN | SYSTOLIC BLOOD PRESSURE: 128 MMHG

## 2022-08-12 DIAGNOSIS — Z79.4 TYPE 2 DIABETES MELLITUS WITH HYPERGLYCEMIA, WITH LONG-TERM CURRENT USE OF INSULIN: Primary | Chronic | ICD-10-CM

## 2022-08-12 DIAGNOSIS — E11.65 TYPE 2 DIABETES MELLITUS WITH HYPERGLYCEMIA, WITH LONG-TERM CURRENT USE OF INSULIN: Primary | Chronic | ICD-10-CM

## 2022-08-12 PROCEDURE — 99213 OFFICE O/P EST LOW 20 MIN: CPT | Performed by: PHYSICIAN ASSISTANT

## 2022-08-12 RX ORDER — LANCETS
EACH MISCELLANEOUS
Qty: 400 EACH | Refills: 3 | Status: SHIPPED | OUTPATIENT
Start: 2022-08-12 | End: 2022-11-08

## 2022-08-12 RX ORDER — SPIRONOLACTONE 50 MG/1
50 TABLET, FILM COATED ORAL DAILY
COMMUNITY

## 2022-08-12 RX ORDER — BLOOD-GLUCOSE METER
1 EACH MISCELLANEOUS 4 TIMES DAILY
Qty: 1 KIT | Refills: 1 | Status: SHIPPED | OUTPATIENT
Start: 2022-08-12 | End: 2022-11-08

## 2022-08-12 NOTE — PROGRESS NOTES
"     Office Note      Date: 2022  Patient Name: Timothy Chacon  MRN: [id number]  : 1945    Chief Complaint   Patient presents with   • Diabetes     History of Present Illness:   Timothy Chacon is a 76 y.o. male who presents today for follow up on type 2 diabetes.  Diabetes was diagnosed in .  Known diabetic complications: nephropathy and retinopathy.  History of severe hypoglycemia requiring EMS.  Current diabetic medications: Novolin 70/30, Ozempic, Farxiga.  Plan is to change mix insulin to basal/bolus.  He received patient assistance for Tresiba and Novolog.  He presents today to go over new treatment plan.  He is testing blood sugars 2 times per day.  Fasting FSBG have been at goal many days and some hypoglycemia.  Readings at night are still 300s-400s.  FSBG was 40 while in the office.  He was given carbohydrate snack.  Glucose was 117 prior to leaving the office.    Subjective        Past Medical History:   Diagnosis Date   • Atrial fibrillation (HCC)    • Benign hypertension    • Family history of colon cancer     Mother and father   • Hypoglycemia due to type 2 diabetes mellitus (HCC)    • Mixed hyperlipidemia    • Obesity    • Renal disease     due to type 2 diabetes mellitus   • Type 2 diabetes mellitus, uncontrolled       Past Surgical History:   Procedure Laterality Date   • CATARACT EXTRACTION Bilateral    • COLECTOMY PARTIAL / TOTAL      with anastomosis   • COLONOSCOPY W/ POLYPECTOMY     • INGUINAL HERNIA REPAIR       The following portions of the patient's history were reviewed and updated as appropriate: allergies, current medications, past family history, past medical history, past social history, past surgical history and problem list.    Objective     Vitals:    22 1258   BP: 128/54   Pulse: 70   SpO2: 98%   Weight: 89.5 kg (197 lb 4.8 oz)   Height: 175.3 cm (69\")   PainSc: 0-No pain   Body mass index is 29.14 kg/m².    Physical Exam  Vitals reviewed.   Constitutional:       General: " He is not in acute distress.  Neurological:      Mental Status: He is alert and oriented to person, place, and time.   Psychiatric:         Mood and Affect: Affect normal.         HEMOGLOBIN A1C  Lab Results   Component Value Date    HGBA1C 10.1 07/05/2022    HGBA1C 10.67 (H) 12/10/2021    HGBA1C 8.4 07/06/2021     GLUCOSE  Lab Results   Component Value Date    POCGLU 87 07/05/2022       Current Outpatient Medications   Medication Instructions   • Accu-Chek Softclix Lancets lancets Testing 4 times per day; E11.65, Z79.4   • Blood Glucose Monitoring Suppl (Accu-Chek Guide Me) w/Device kit 1 each, Does not apply, 4 Times Daily, Dx: E11.65, Z79.4   • carvedilol (COREG) 12.5 MG tablet 1 tablet, 2 times daily   • Cholecalciferol (Vitamin D) 50 MCG (2000 UT) capsule Oral   • dapagliflozin (FARXIGA) 5 mg, Oral, Daily   • doxazosin (CARDURA) 8 MG tablet 1 tablet, Daily   • Eliquis 5 mg, Oral, 2 Times Daily   • fenofibrate 160 MG tablet 1 tablet, Daily   • finasteride (PROSCAR) 5 MG tablet 1 tablet, Daily   • flecainide (TAMBOCOR) 50 MG tablet 1 tablet, 1 in am, 1 in pm   • glucose blood (Accu-Chek Guide) test strip Testing 4 times per day; E11.65, Z79.4   • ketorolac (ACULAR) 0.5 % ophthalmic solution PLEASE SEE ATTACHED FOR DETAILED DIRECTIONS   • metOLazone (ZAROXOLYN) 2.5 MG tablet 1 tablet, Every Other Day   • montelukast (SINGULAIR) 10 MG tablet 1 tablet, Daily   • NIFEdipine XL (PROCARDIA XL) 60 MG 24 hr tablet Daily   • NovoFine Plus Pen Needle 32G X 4 MM misc Use 4 per day   • NovoLOG FlexPen 100 UNIT/ML solution pen-injector sc pen Inject up to 14 units TID with meals plus correction 1 unit per 50 over 150; max 40 units daily   • ofloxacin (OCUFLOX) 0.3 % ophthalmic solution INSTILL 1 DROP INTO OPERATIVE EYE 4 TIMES A DAY FOR 7 DAYS   • prednisoLONE acetate (PRED FORTE) 1 % ophthalmic suspension INSTILL 1 DORP INTO OPERATIVE EYE 4 TIMES A DAY FOR 7 DAYS, THEN DECREASE TO TWICE DIALY FOR 14 DAYS   • rosuvastatin  (CRESTOR) 10 mg, Oral, Daily   • Semaglutide, 1 MG/DOSE, (Ozempic, 1 MG/DOSE,) 2 MG/1.5ML solution pen-injector Inject 1 mg weekly   • spironolactone (ALDACTONE) 50 mg, Oral, Daily   • Tresiba FlexTouch 100 UNIT/ML solution pen-injector injection Inject daily, titrate up to max 40 units daily       Assessment / Plan      Assessment & Plan:  1. Type 2 diabetes mellitus with hyperglycemia, with long-term current use of insulin (HCC)  Stop Novolin 70/30.  Start basal/bolus insulin.  Continue Ozempic and Farxiga.  He will increase monitoring of blood sugars to 4 times per day.  We discussed Medicare's criteria for coverage of CGM.  Plan to apply for CGM after next visit.  Referral made for diabetes education.    Patient Instructions   Sent Rx to [facility] for Accu-Chek Guide and supplies.  Plan to apply for coverage of CGM (Dexcom G6 or Freestyle Yessy 2) after next visit.  Continue Ozempic and Farxiga.  **Stop Novolin 70/30 insulin.    New insulins:    Tresiba 28 units once daily (every morning).    Fiasp 8 units with meals plus correction if blood sugar is high before meal.     Fiasp - correction dose (add to mealtime insulin):   0 units Less than 150   1 units 150 - 199   2 units 200 - 249   3 units 250 - 299   4 units 300 - 349   5 units 350 - 399   6 units Over 400      - Blood Glucose Monitoring Suppl (Accu-Chek Guide Me) w/Device kit; 1 each 4 (Four) Times a Day. Dx: E11.65, Z79.4  Dispense: 1 kit; Refill: 1  - glucose blood (Accu-Chek Guide) test strip; Testing 4 times per day; E11.65, Z79.4  Dispense: 400 each; Refill: 3  - Accu-Chek Softclix Lancets lancets; Testing 4 times per day; E11.65, Z79.4  Dispense: 400 each; Refill: 3  - Ambulatory Referral to Diabetic Education      Return in about 3 months (around 11/12/2022) for next scheduled follow up. He was advised to contact the office with any interval questions or concerns.    SHERWIN Dc  Endocrinology  08/12/2022

## 2022-08-12 NOTE — PATIENT INSTRUCTIONS
[facility] Guide and [facility].  Plan to apply for coverage of CGM (Dexcom G6 or Freestyle Yessy 2) after next visit.    Continue Ozempic and Farxiga.  **Stop Novolin 70/30 insulin.    New insulins:  ol0  Tresiba 28 units once daily (every morning).    Fiasp 8 units with meals plus correction if blood sugar is high before meal.     Fiasp - correction dose (add to mealtime insulin):   0 units Less than 150   1 units 150 - 199   2 units 200 - 249   3 units 250 - 299   4 units 300 - 349   5 units 350 - 399   6 units Over 400

## 2022-08-12 NOTE — PLAN OF CARE
CV completed at request of provider. Patient had low episode treated in office by staff. Patient voices that he took insulin earlier today with very little food. Discussed the importance of insulin food balance to prevent low blood sugar. Discussed briefly Dexcom 6. Booklet was given and demo was shown to discuss using trend arrows to assist with decisions to prevent low episodes. Patient to call office for scheduling when device is obtained. Thank you for this opportunity.

## 2022-08-16 ENCOUNTER — TELEPHONE (OUTPATIENT)
Dept: ENDOCRINOLOGY | Facility: CLINIC | Age: 77
End: 2022-08-16

## 2022-08-19 ENCOUNTER — TELEPHONE (OUTPATIENT)
Dept: ENDOCRINOLOGY | Facility: CLINIC | Age: 77
End: 2022-08-19

## 2022-08-19 NOTE — TELEPHONE ENCOUNTER
Called [facility] [employer] to d/c novolin 70/30.  She will send a return label to clinic fax to return what we got in.

## 2022-08-29 ENCOUNTER — TELEPHONE (OUTPATIENT)
Dept: ENDOCRINOLOGY | Facility: CLINIC | Age: 77
End: 2022-08-29

## 2022-09-01 ENCOUNTER — TELEPHONE (OUTPATIENT)
Dept: ENDOCRINOLOGY | Facility: CLINIC | Age: 77
End: 2022-09-01

## 2022-09-01 NOTE — TELEPHONE ENCOUNTER
[facility] IS SENT REQUEST FOR CHART NOTES ON 8/31/22. THEY ARE CALLING TO CHECK STATUS AS THEY NEED THE LAST CLINICAL NOTE TO DETERMINE CGM DEXCOM G6. PHONE NUMBER [phone] REF#[id number]. FAX NUMBER [phone]

## 2022-09-01 NOTE — TELEPHONE ENCOUNTER
PATIENT CALLED STATING THAT [facility] CONTACTED HIM REGARDING THE NEED OF RECENT OFFICE NOTES. ADVISED PATIENT THAT REQUESTED ITEMS HAVE BEEN FAXED BACK TO [facility] TODAY.

## 2022-09-07 ENCOUNTER — OFFICE VISIT (OUTPATIENT)
Dept: ENDOCRINOLOGY | Facility: CLINIC | Age: 77
End: 2022-09-07

## 2022-09-07 VITALS
BODY MASS INDEX: 29.03 KG/M2 | OXYGEN SATURATION: 98 % | HEART RATE: 68 BPM | WEIGHT: 196 LBS | DIASTOLIC BLOOD PRESSURE: 64 MMHG | SYSTOLIC BLOOD PRESSURE: 146 MMHG | HEIGHT: 69 IN

## 2022-09-07 DIAGNOSIS — Z79.4 TYPE 2 DIABETES MELLITUS WITH HYPERGLYCEMIA, WITH LONG-TERM CURRENT USE OF INSULIN: Primary | ICD-10-CM

## 2022-09-07 DIAGNOSIS — E11.65 TYPE 2 DIABETES MELLITUS WITH HYPERGLYCEMIA, WITH LONG-TERM CURRENT USE OF INSULIN: Primary | ICD-10-CM

## 2022-09-07 DIAGNOSIS — E78.2 MIXED HYPERLIPIDEMIA: ICD-10-CM

## 2022-09-07 DIAGNOSIS — I10 BENIGN HYPERTENSION: ICD-10-CM

## 2022-09-07 LAB
EXPIRATION DATE: ABNORMAL
GLUCOSE BLDC GLUCOMTR-MCNC: 424 MG/DL (ref 70–130)
Lab: ABNORMAL

## 2022-09-07 PROCEDURE — 82947 ASSAY GLUCOSE BLOOD QUANT: CPT | Performed by: INTERNAL MEDICINE

## 2022-09-07 PROCEDURE — 99214 OFFICE O/P EST MOD 30 MIN: CPT | Performed by: INTERNAL MEDICINE

## 2022-09-07 NOTE — PROGRESS NOTES
"     Office Note      Date: 2022  Patient Name: Timothy Chacon  MRN: [id number]  : 1945    Chief Complaint   Patient presents with   • Diabetes       History of Present Illness:   Timothy Chacon is a 76 y.o. male who presents for Diabetes type 2. Diagnosed in: . Treated in past with oral agents. Current treatments: basal-bolus insulin, farxiga and ozempic. Number of insulin shots per day: 4. Checks blood sugar 2 times a day. Has low blood sugar: occasional severe. Aspirin use: No - on eliquis. Statin use: Yes. ACE-I/ARB use: No - due to hypotension. Changes in health since last visit: none. Last eye exam 2021.    Subjective      Diabetic Complications:  Eyes: Yes - BDR  Kidneys: Yes -    Feet: No  Heart: No    Diet and Exercise:  Meals per day: 2  Minutes of exercise per week: 0 mins.    Review of Systems:   Review of Systems   Constitutional: Negative.    Cardiovascular: Negative.    Gastrointestinal: Negative.    Endocrine: Negative.        The following portions of the patient's history were reviewed and updated as appropriate: allergies, current medications, past family history, past medical history, past social history, past surgical history and problem list.    Objective       Visit Vitals  /64   Pulse 68   Ht 175.3 cm (69\")   Wt 88.9 kg (196 lb)   SpO2 98%   BMI 28.94 kg/m²       Physical Exam:  Physical Exam  Constitutional:       Appearance: Normal appearance.   Cardiovascular:      Pulses:           Dorsalis pedis pulses are 1+ on the right side and 1+ on the left side.        Posterior tibial pulses are 1+ on the right side and 1+ on the left side.   Feet:      Right foot:      Protective Sensation: 5 sites tested. 5 sites sensed.      Skin integrity: Skin integrity normal.      Toenail Condition: Right toenails are abnormally thick. Fungal disease present.     Left foot:      Protective Sensation: 5 sites tested. 5 sites sensed.      Skin integrity: Skin integrity normal.      Toenail " Condition: Left toenails are abnormally thick. Fungal disease present.     Comments: Pulses only faintly palpable in both feet  Neurological:      Mental Status: He is alert.         Labs:    HbA1c  Lab Results   Component Value Date    HGBA1C 10.1 07/05/2022       CMP  Lab Results   Component Value Date    GLUCOSE 44 (C) 12/10/2021    BUN 32 (H) 12/10/2021    CREATININE 1.89 (H) 12/10/2021    EGFRIFNONA 35 (L) 12/10/2021    BCR 16.9 12/10/2021    K 4.8 12/10/2021    CO2 25.3 12/10/2021    CALCIUM 9.9 12/10/2021    AST 18 12/10/2021    ALT 10 12/10/2021        Lipid Panel  Lab Results   Component Value Date    HDL 36 (L) 12/10/2021    LDL 76 12/10/2021    TRIG 93 12/10/2021        TSH  Lab Results   Component Value Date    TSH 1.890 12/10/2021        Hemoglobin A1C  Lab Results   Component Value Date    HGBA1C 10.1 07/05/2022        Microalbumin/Creatinine  No results found for: MALBCRERATIO, CREATINIURIN, MICROALBUR        Assessment / Plan      Assessment & Plan:  Diagnoses and all orders for this visit:    1. Type 2 diabetes mellitus with hyperglycemia, with long-term current use of insulin (HCC) (Primary)  Assessment & Plan:  Diabetes is unchanged.  He is starting basal bolus insulin soon.  Needs new glucometer.  Continue current treatment regimen.  Diabetes will be reassessed in 3 months.    Filled out shoe forms today.    Trying to get DexCom through [facility].    Orders:  -     POC Glucose, Blood    2. Benign hypertension  Assessment & Plan:  Hypertension is worsening.  Continue current treatment regimen.  Blood pressure will be reassessed at the next regular appointment.      3. Mixed hyperlipidemia  Assessment & Plan:  Continue statin.        Return in about 3 months (around 12/7/2022) for Recheck with A1c.    Zac Fish MD   09/07/2022

## 2022-09-07 NOTE — ASSESSMENT & PLAN NOTE
Diabetes is unchanged.  He is starting basal bolus insulin soon.  Needs new glucometer.  Continue current treatment regimen.  Diabetes will be reassessed in 3 months.    Filled out shoe forms today.    Trying to get DexCom through [facility].   Sees renal

## 2022-11-08 ENCOUNTER — OFFICE VISIT (OUTPATIENT)
Dept: ENDOCRINOLOGY | Facility: CLINIC | Age: 77
End: 2022-11-08
Payer: MEDICARE

## 2022-11-08 VITALS
DIASTOLIC BLOOD PRESSURE: 70 MMHG | HEIGHT: 69 IN | HEART RATE: 70 BPM | SYSTOLIC BLOOD PRESSURE: 132 MMHG | BODY MASS INDEX: 28.97 KG/M2 | OXYGEN SATURATION: 98 % | WEIGHT: 195.6 LBS

## 2022-11-08 DIAGNOSIS — E11.65 TYPE 2 DIABETES MELLITUS WITH HYPERGLYCEMIA, WITH LONG-TERM CURRENT USE OF INSULIN: Primary | Chronic | ICD-10-CM

## 2022-11-08 DIAGNOSIS — Z79.4 TYPE 2 DIABETES MELLITUS WITH HYPERGLYCEMIA, WITH LONG-TERM CURRENT USE OF INSULIN: Primary | Chronic | ICD-10-CM

## 2022-11-08 LAB
EXPIRATION DATE: ABNORMAL
EXPIRATION DATE: NORMAL
GLUCOSE BLDC GLUCOMTR-MCNC: 165 MG/DL (ref 70–130)
HBA1C MFR BLD: 8.7 %
Lab: ABNORMAL
Lab: NORMAL

## 2022-11-08 PROCEDURE — 95251 CONT GLUC MNTR ANALYSIS I&R: CPT | Performed by: PHYSICIAN ASSISTANT

## 2022-11-08 PROCEDURE — 83036 HEMOGLOBIN GLYCOSYLATED A1C: CPT | Performed by: PHYSICIAN ASSISTANT

## 2022-11-08 PROCEDURE — 3052F HG A1C>EQUAL 8.0%<EQUAL 9.0%: CPT | Performed by: PHYSICIAN ASSISTANT

## 2022-11-08 PROCEDURE — 99213 OFFICE O/P EST LOW 20 MIN: CPT | Performed by: PHYSICIAN ASSISTANT

## 2022-11-08 RX ORDER — AMMONIUM LACTATE 12 G/100G
LOTION TOPICAL
COMMUNITY
Start: 2022-09-07

## 2022-11-08 RX ORDER — LORATADINE 10 MG/1
10 TABLET ORAL DAILY
COMMUNITY

## 2022-11-29 ENCOUNTER — TELEPHONE (OUTPATIENT)
Dept: ENDOCRINOLOGY | Facility: CLINIC | Age: 77
End: 2022-11-29

## 2023-03-20 ENCOUNTER — TELEPHONE (OUTPATIENT)
Dept: ENDOCRINOLOGY | Facility: CLINIC | Age: 78
End: 2023-03-20
Payer: MEDICARE

## 2023-03-20 NOTE — TELEPHONE ENCOUNTER
Spoke with patient.  Advised that we had not received patient assistance.  He states he spoke with them this morning and they advised he had been approved.

## 2023-04-17 ENCOUNTER — TELEPHONE (OUTPATIENT)
Dept: ENDOCRINOLOGY | Facility: CLINIC | Age: 78
End: 2023-04-17
Payer: MEDICARE

## 2023-05-05 ENCOUNTER — OFFICE VISIT (OUTPATIENT)
Dept: ENDOCRINOLOGY | Facility: CLINIC | Age: 78
End: 2023-05-05
Payer: MEDICARE

## 2023-05-05 VITALS
SYSTOLIC BLOOD PRESSURE: 122 MMHG | DIASTOLIC BLOOD PRESSURE: 75 MMHG | OXYGEN SATURATION: 97 % | HEIGHT: 69 IN | BODY MASS INDEX: 30.96 KG/M2 | WEIGHT: 209 LBS | HEART RATE: 72 BPM

## 2023-05-05 DIAGNOSIS — E78.2 MIXED HYPERLIPIDEMIA: Chronic | ICD-10-CM

## 2023-05-05 DIAGNOSIS — Z79.4 TYPE 2 DIABETES MELLITUS WITH HYPERGLYCEMIA, WITH LONG-TERM CURRENT USE OF INSULIN: Primary | Chronic | ICD-10-CM

## 2023-05-05 DIAGNOSIS — N18.31 STAGE 3A CHRONIC KIDNEY DISEASE: Chronic | ICD-10-CM

## 2023-05-05 DIAGNOSIS — E11.65 TYPE 2 DIABETES MELLITUS WITH HYPERGLYCEMIA, WITH LONG-TERM CURRENT USE OF INSULIN: Primary | Chronic | ICD-10-CM

## 2023-05-05 LAB
EXPIRATION DATE: ABNORMAL
GLUCOSE BLDC GLUCOMTR-MCNC: 141 MG/DL (ref 70–130)
HBA1C MFR BLD: 8 %
HBA1C MFR BLD: 8 %
Lab: ABNORMAL

## 2023-05-05 RX ORDER — ROSUVASTATIN CALCIUM 20 MG/1
TABLET, COATED ORAL
COMMUNITY
Start: 2023-03-14

## 2023-05-05 RX ORDER — BLOOD-GLUCOSE,RECEIVER,CONT
EACH MISCELLANEOUS
COMMUNITY

## 2023-05-05 NOTE — PROGRESS NOTES
"     Office Note      Date: 2023  Patient Name: Timothy Chacon  MRN: [id number]  : 1945    Chief Complaint   Patient presents with   • Diabetes       History of Present Illness  Timothy Chacon is a 77 y.o. male who presents today for follow up on type 2 diabetes.   Diabetes was diagnosed in .  Known diabetic complications: nephropathy and retinopathy.  History of severe hypoglycemia requiring EMS.  Current diabetic medications: Basal/bolus insulin, Ozempic, Farxiga.  He is using the Dexcom G6 CGM.  He is frequently adjusting insulin based on glucose readings.  He reports occasional hypoglycemia.  He denies any severe hypoglycemia.  Feet: No sores or current issues. Podiatrist q9 weeks.  Last eye exam: 2022.  ACE inhibitor/ARB: No.  Statin: Rosuvastatin.  CKD - followed by nephrology, Dr. Elissa Albarran.  He reports having steroid injection right hip in 2023.  He had labs with his PCP on 3/7/2023.  A1c was 8.0%.  Creatinine 1.56, EGFR 45.  Rest of CMP okay.  Total cholesterol 170, , HDL 47, triglycerides 135.    Review of systems  As noted in HPI.    Past Medical History:   Diagnosis Date   • Atrial fibrillation    • Benign hypertension    • Family history of colon cancer     Mother and father   • Hypoglycemia due to type 2 diabetes mellitus    • Mixed hyperlipidemia    • Obesity    • Renal disease     due to type 2 diabetes mellitus   • Type 2 diabetes mellitus, uncontrolled       Past Surgical History:   Procedure Laterality Date   • CATARACT EXTRACTION Bilateral    • COLECTOMY PARTIAL / TOTAL      with anastomosis   • COLONOSCOPY W/ POLYPECTOMY     • INGUINAL HERNIA REPAIR       The following portions of the patient's history were reviewed and updated as appropriate: allergies, current medications, past family history, past medical history, past social history, past surgical history and problem list.    Vitals:  /75   Pulse 72   Ht 175.3 cm (69\")   Wt 94.8 kg (209 lb)   SpO2 97%   BMI " 30.86 kg/m²     Physical Exam  Vitals reviewed.   Constitutional:       General: He is not in acute distress.  Neurological:      Mental Status: He is alert and oriented to person, place, and time.   Psychiatric:         Mood and Affect: Affect normal.       Labs/Imaging    Hemoglobin A1c  Lab Results   Component Value Date    HGBA1C 8.0 03/07/2023    HGBA1C 8.7 11/08/2022    HGBA1C 10.1 07/05/2022     Glucose  Lab Results   Component Value Date    POCGLU 141 (A) 05/05/2023       Current Outpatient Medications   Medication Instructions   • carvedilol (COREG) 12.5 MG tablet 1 tablet, 2 times daily   • Cholecalciferol (Vitamin D) 50 MCG (2000 UT) capsule Oral   • Continuous Blood Gluc  (Dexcom G5  Kit) device Does not apply   • Continuous Blood Gluc  (Dexcom G7 ) device Does not apply   • dapagliflozin (FARXIGA) 5 mg, Oral, Daily   • doxazosin (CARDURA) 8 MG tablet 1 tablet, Daily   • Eliquis 5 mg, Oral, 2 Times Daily   • fenofibrate 160 MG tablet 1 tablet, Daily   • finasteride (PROSCAR) 5 MG tablet 1 tablet, Daily   • flecainide (TAMBOCOR) 50 MG tablet 1 tablet, 1 in am, 1 in pm   • glucose blood (Accu-Chek Guide) test strip Testing 1 time per day PRN along with CGM; E11.65, Z79.4   • loratadine (CLARITIN) 10 mg, Oral, Daily   • metOLazone (ZAROXOLYN) 2.5 MG tablet 1 tablet, Every Other Day   • montelukast (SINGULAIR) 10 MG tablet 1 tablet, Daily   • NIFEdipine XL (PROCARDIA XL) 30 mg, Daily   • NovoFine Plus Pen Needle 32G X 4 MM misc Use 4 per day   • NovoLOG FlexPen 100 UNIT/ML solution pen-injector sc pen Inject up to 14 units TID with meals plus correction 1 unit per 50 over 150; max 40 units daily   • rosuvastatin (CRESTOR) 20 MG tablet No dose, route, or frequency recorded.   • Semaglutide, 1 MG/DOSE, (Ozempic, 1 MG/DOSE,) 2 MG/1.5ML solution pen-injector Inject 1 mg weekly   • spironolactone (ALDACTONE) 50 mg, Oral, Daily   • Tresiba FlexTouch 100 UNIT/ML solution pen-injector  injection Inject daily, titrate up to max 40 units daily       Assessment & Plan  1. Type 2 diabetes mellitus with hyperglycemia, with long-term current use of insulin  Diabetes is uncontrolled, but has improved again.  Recent A1c down to 8.0%.  Reviewed Dexcom CGM data and discussed with patient.  Sensor glucose average 190 for the past 2 weeks, 49% time in range , 1% low 54-69, 0% very low <54, 19% high >250.  Discussed importance of timing Novolog with meals to prevent postprandial hyperglycemia.  Continue basal/bolus insulin, Ozempic, Farxiga.  Adjusted Novolog doses as below.    Patient Instructions     Continue Ozempic and Farxiga.     Tresiba 28 units once daily (every morning).     Novolog 8 units with breakfast and lunch, 10 units with dinner plus correction if blood sugar is high before meal.               Novolog - correction dose (add to mealtime insulin and/or every 4 hours for high blood blood sugar):   0 units Less than 150   1 units 150 - 199   2 units 200 - 249   3 units 250 - 299   4 units 300 - 349   5 units 350 - 399   6 units Over 400        - POC Glucose, Blood  - glucose blood (Accu-Chek Guide) test strip; Testing 1 time per day PRN along with CGM; E11.65, Z79.4    2. Mixed hyperlipidemia  Recent LDL was 100.  Rosuvastatin was increased to 20 mg daily.  Lipids monitored by PCP.    3. Stage 3a chronic kidney disease  Encouraged good blood sugar control.  BP well controlled.  Continue Farxiga.      Return in about 3 months (around 8/5/2023) for next scheduled follow up with foot exam. He was advised to contact the office with any interval questions or concerns.    SHERWIN Dc  Endocrinology  05/05/2023

## 2023-05-05 NOTE — PATIENT INSTRUCTIONS
Continue Ozempic and Farxiga.     Tresiba 28 units once daily (every morning).     Novolog 8 units with breakfast and lunch, 10 units with dinner plus correction if blood sugar is high before meal.               Novolog - correction dose (add to mealtime insulin and/or every 4 hours for high blood blood sugar):   0 units Less than 150   1 units 150 - 199   2 units 200 - 249   3 units 250 - 299   4 units 300 - 349   5 units 350 - 399   6 units Over 400

## 2023-07-26 ENCOUNTER — TELEPHONE (OUTPATIENT)
Dept: ENDOCRINOLOGY | Facility: CLINIC | Age: 78
End: 2023-07-26
Payer: MEDICARE

## 2023-08-04 ENCOUNTER — TELEPHONE (OUTPATIENT)
Dept: ENDOCRINOLOGY | Facility: CLINIC | Age: 78
End: 2023-08-04

## 2023-08-04 NOTE — TELEPHONE ENCOUNTER
Provider: EVELIA    Caller: LIZBETH KILPATRICK    Relationship to Patient: SELF    Reason for Call: PATIENT WILL BE COMING TO THE OFFICE EITHER THIS AFTERNOON OR MONDAY TO  ozempic tresiba novolog pen needles THROUGH HIS pt assistance PROGRAM.

## 2023-09-21 ENCOUNTER — OFFICE VISIT (OUTPATIENT)
Dept: ENDOCRINOLOGY | Facility: CLINIC | Age: 78
End: 2023-09-21
Payer: MEDICARE

## 2023-09-21 VITALS
OXYGEN SATURATION: 98 % | HEART RATE: 68 BPM | DIASTOLIC BLOOD PRESSURE: 72 MMHG | SYSTOLIC BLOOD PRESSURE: 116 MMHG | HEIGHT: 69 IN | WEIGHT: 203 LBS | BODY MASS INDEX: 30.07 KG/M2

## 2023-09-21 DIAGNOSIS — I10 BENIGN HYPERTENSION: ICD-10-CM

## 2023-09-21 DIAGNOSIS — Z79.4 INSULIN LONG-TERM USE: ICD-10-CM

## 2023-09-21 DIAGNOSIS — E11.65 TYPE 2 DIABETES MELLITUS WITH HYPERGLYCEMIA, WITH LONG-TERM CURRENT USE OF INSULIN: Primary | ICD-10-CM

## 2023-09-21 DIAGNOSIS — Z79.4 TYPE 2 DIABETES MELLITUS WITH HYPERGLYCEMIA, WITH LONG-TERM CURRENT USE OF INSULIN: Primary | ICD-10-CM

## 2023-09-21 LAB — HBA1C MFR BLD: 7.5 %

## 2023-09-21 PROCEDURE — 1160F RVW MEDS BY RX/DR IN RCRD: CPT | Performed by: PHYSICIAN ASSISTANT

## 2023-09-21 PROCEDURE — 1159F MED LIST DOCD IN RCRD: CPT | Performed by: PHYSICIAN ASSISTANT

## 2023-09-21 PROCEDURE — 95251 CONT GLUC MNTR ANALYSIS I&R: CPT | Performed by: PHYSICIAN ASSISTANT

## 2023-09-21 PROCEDURE — 3078F DIAST BP <80 MM HG: CPT | Performed by: PHYSICIAN ASSISTANT

## 2023-09-21 PROCEDURE — 3074F SYST BP LT 130 MM HG: CPT | Performed by: PHYSICIAN ASSISTANT

## 2023-09-21 PROCEDURE — 99214 OFFICE O/P EST MOD 30 MIN: CPT | Performed by: PHYSICIAN ASSISTANT

## 2023-09-21 RX ORDER — PROCHLORPERAZINE 25 MG/1
SUPPOSITORY RECTAL
COMMUNITY

## 2023-09-21 RX ORDER — DAPAGLIFLOZIN 5 MG/1
5 TABLET, FILM COATED ORAL DAILY
COMMUNITY

## 2023-09-21 NOTE — PROGRESS NOTES
Office Note      Date: 2023  Patient Name: Timothy Chacon  MRN: [id number]  : 1945    Chief Complaint   Patient presents with    Diabetes       History of Present Illness:   Timothy Chacon is a 77 y.o. male who presents for follow-up for type 2 diabetes diagnosed in .  He was previously followed by Breanna Low.  He has a history of nephropathy as well as retinopathy.  He remains on Ozempic 1 mg weekly, Farxiga 5 mg daily though as this was recently increased by his primary care physician to 10 mg daily.  He reports his nephrologist in the past is felt this is too much so he is checking in with them before he increases the dose.  He is taking Tresiba 28 units every morning and NovoLog 8 units with breakfast and lunch and 10 units with supper plus an additional 1 unit for every 50 mg/dL his blood glucose is above 150 mg/dL.  He often does not take his NovoLog until several hours after he eats because he does not carry his insulin with him when he is out.  He gets his insulins and Ozempic through patient assistance.  He reports he wakes up several times a week with blood sugars in the 80s around 2 or 3 AM.  He reports he has a snack at this time because he is concerned about hypoglycemia.  He is up-to-date on his eye exam he had this completed in July and all was okay.  He continues to see the podiatrist every 9 weeks.  He sees his nephrologist Dr. Esteves regularly and had labs completed in August.  His hemoglobin A1c at this time was 7.5%.  His primary care physician also monitors his fasting labs.  He stays busy helping to care for his grandkids.  He continues to use the Dexcom G6 continuous glucose monitor.  He reports he is almost out of sensors and [facility] was supposed to send us a form to sign for him to get refills.  We have not received this.    Subjective     Review of Systems:   Review of Systems   Constitutional: Negative.    Cardiovascular: Negative.    Gastrointestinal: Negative.    Endocrine:  "Negative.    Neurological: Negative.      The following portions of the patient's history were reviewed and updated as appropriate: allergies, current medications, past family history, past medical history, past social history, past surgical history, and problem list.    Objective     Vitals:    09/21/23 1344   BP: 116/72   Pulse: 68   SpO2: 98%   Weight: 92.1 kg (203 lb)   Height: 175.3 cm (69\")     Body mass index is 29.98 kg/m².    Physical Exam  Vitals reviewed.   Constitutional:       General: He is not in acute distress.     Appearance: Normal appearance.   Neurological:      Mental Status: He is alert.       HEMOGLOBIN A1C  Lab Results   Component Value Date    HGBA1C 7.5 08/02/2023       GLUCOSE  Glucose   Date Value Ref Range Status   05/05/2023 141 (A) 70 - 130 mg/dL Final             Assessment / Plan      Assessment & Plan:  1. Type 2 diabetes mellitus with hyperglycemia, with long-term current use of insulin  His hemoglobin A1c last month had improved at 7.5%.  I congratulated him on his efforts.  I reviewed his Dexcom download his blood sugar readings are 65% in range with 21% high, 13% very high, less than 1% low and less than 1% very low.  He complains he wakes up around 2 AM with blood sugars in the 80s and though these are not actually low he worries about hypoglycemia so he has a snack.  I encouraged him to reduce his Tresiba to 26 units daily to try to prevent this.  We discussed taking NovoLog with him when he goes out to lunch or dinner but he reports this is not going to happen.  He will continue Ozempic 1 mg weekly and discuss his Farxiga dosing with his nephrologist.  His weight is down 6 pounds since his appointment in May.  I encouraged healthy eating habits and physical activity as tolerated.    2. Benign hypertension  His blood pressure is okay today.  He will continue his current medications and follow-up with his nephrologist.    3. Insulin long-term use        Return in about 3 months " (around 12/21/2023) for Recheck 3-4 mos.     This note was dictated using Dragon voice recognition.    Rocio Ellis PA-C  09/21/2023

## 2023-09-29 ENCOUNTER — TELEPHONE (OUTPATIENT)
Dept: ENDOCRINOLOGY | Facility: CLINIC | Age: 78
End: 2023-09-29
Payer: MEDICARE

## 2023-09-29 NOTE — TELEPHONE ENCOUNTER
Patient called wanting to check the status of his [facility] medical forms. He stated he does not know what to do if we have not received because he is out of his dexcom supplies. Please advise.

## 2023-09-29 NOTE — TELEPHONE ENCOUNTER
ALEK pt. And advised we have not received any paperwork from [facility] but that I would go ahead and fax his most recent office visit to [facility] and hopefully that will get them to send him his supplies. KHALIDA

## 2023-10-04 ENCOUNTER — TELEPHONE (OUTPATIENT)
Dept: ENDOCRINOLOGY | Facility: CLINIC | Age: 78
End: 2023-10-04

## 2023-10-04 NOTE — TELEPHONE ENCOUNTER
Caller: Timothy Chacon    Relationship to patient: Self    Best call back number: [phone]     Patient is needing: PT STATES HE'S BEEN TRYING TO GET HIS DEXCOM G6 PRESCRIPTION FOR SEVERAL WEEKS, SAYS [facility] HAS FAXED UP SEVERAL TIMES. PT IS OUT AND SAYS ITS THE PART OF THE DEXCOM G6 THAT HE GETS ONCE A MONTH SUPPLY-- HE COULDN'T SPECIFY IF THIS IS THE SENSORS OR THE ENTIRE TRANSMITTER PLEASE CHECK AND ADVISE. PLEASE CALL PT BACK WITH AN UPDATE, NO VM.

## 2023-10-04 NOTE — TELEPHONE ENCOUNTER
Spoke with [facility].  They state they are faxing over order form.  Patient notified.

## 2023-10-09 ENCOUNTER — TELEPHONE (OUTPATIENT)
Dept: ENDOCRINOLOGY | Facility: CLINIC | Age: 78
End: 2023-10-09

## 2023-10-09 NOTE — TELEPHONE ENCOUNTER
I called the patient to informed him that we did send in the form on 10/5/23 and I resent it today. Patient verbalzied understanding.

## 2023-10-09 NOTE — TELEPHONE ENCOUNTER
Caller: Timothy Chacon    Relationship: Self    Best call back number: [phone]    Requested Prescriptions: DEXCOM G6 SENSOR  Requested Prescriptions      No prescriptions requested or ordered in this encounter        Pharmacy where request should be sent: [facility]/ FAX# [phone]     Last office visit with prescribing clinician: 9/21/2023   Last telemedicine visit with prescribing clinician: Visit date not found   Next office visit with prescribing clinician: 2/5/2024     Additional details provided by patient: PT WAS ADVISED THAT [facility] STILL HAS NOT GOTTEN THAT NEW SCRIPT FOR THE DEXCOM G6 SENSOR THAT GOES STICKS ON HIS ABDOMEN.     Does the patient have less than a 3 day supply:  [x] Yes  [] No    Would you like a call back once the refill request has been completed: [x] Yes [] No    If the office needs to give you a call back, can they leave a voicemail: [x] Yes [] No    Shelly Page Rep   10/09/23 12:48 EDT

## 2023-11-07 ENCOUNTER — TELEPHONE (OUTPATIENT)
Dept: ENDOCRINOLOGY | Facility: CLINIC | Age: 78
End: 2023-11-07
Payer: MEDICARE

## 2023-11-17 ENCOUNTER — TELEPHONE (OUTPATIENT)
Dept: ENDOCRINOLOGY | Facility: CLINIC | Age: 78
End: 2023-11-17
Payer: MEDICARE

## 2023-11-17 NOTE — TELEPHONE ENCOUNTER
I spoke to the patient and told him his novolog patient assistance is here in the office ready for pickup.

## 2024-02-07 ENCOUNTER — OFFICE VISIT (OUTPATIENT)
Dept: ENDOCRINOLOGY | Facility: CLINIC | Age: 79
End: 2024-02-07
Payer: MEDICARE

## 2024-02-07 VITALS
HEIGHT: 69 IN | SYSTOLIC BLOOD PRESSURE: 108 MMHG | DIASTOLIC BLOOD PRESSURE: 60 MMHG | BODY MASS INDEX: 29.92 KG/M2 | WEIGHT: 202 LBS | HEART RATE: 67 BPM | OXYGEN SATURATION: 95 %

## 2024-02-07 DIAGNOSIS — Z79.4 TYPE 2 DIABETES MELLITUS WITH HYPOGLYCEMIA WITHOUT COMA, WITH LONG-TERM CURRENT USE OF INSULIN: ICD-10-CM

## 2024-02-07 DIAGNOSIS — E11.65 TYPE 2 DIABETES MELLITUS WITH HYPERGLYCEMIA, WITH LONG-TERM CURRENT USE OF INSULIN: Primary | ICD-10-CM

## 2024-02-07 DIAGNOSIS — Z79.4 TYPE 2 DIABETES MELLITUS WITH HYPERGLYCEMIA, WITH LONG-TERM CURRENT USE OF INSULIN: Primary | ICD-10-CM

## 2024-02-07 DIAGNOSIS — Z79.4 TYPE 2 DIABETES MELLITUS WITH DIABETIC NEPHROPATHY, WITH LONG-TERM CURRENT USE OF INSULIN: ICD-10-CM

## 2024-02-07 DIAGNOSIS — I10 BENIGN HYPERTENSION: ICD-10-CM

## 2024-02-07 DIAGNOSIS — E11.649 TYPE 2 DIABETES MELLITUS WITH HYPOGLYCEMIA WITHOUT COMA, WITH LONG-TERM CURRENT USE OF INSULIN: ICD-10-CM

## 2024-02-07 DIAGNOSIS — E78.2 MIXED HYPERLIPIDEMIA: ICD-10-CM

## 2024-02-07 DIAGNOSIS — E11.21 TYPE 2 DIABETES MELLITUS WITH DIABETIC NEPHROPATHY, WITH LONG-TERM CURRENT USE OF INSULIN: ICD-10-CM

## 2024-02-07 LAB
EXPIRATION DATE: ABNORMAL
GLUCOSE BLDC GLUCOMTR-MCNC: 159 MG/DL (ref 70–130)
HBA1C MFR BLD: 7.5 %
Lab: ABNORMAL

## 2024-02-07 NOTE — ASSESSMENT & PLAN NOTE
Continue CGM.  
Continue SGLT-2 inhibitor.  Continue nephrology follow up.  
Continue statin and fenofibrate.  
Diabetes is unchanged.  A1c acceptable at 7.5%.  Continue current treatment regimen.  Diabetes will be reassessed in 3 months.    DexCom G6 was downloaded today.  Data was reviewed from 1/25/24 to 2/7/24.  This showed good overnight glucose control.  Having some postprandial spikes.  He admits to taking his mealtime insulin late after eating.  We discussed timing of insulin with meals.    
Hypertension is unchanged.  Continue current treatment regimen.  Blood pressure will be reassessed at the next regular appointment.  
dizziness

## 2024-02-07 NOTE — PROGRESS NOTES
"     Office Note      Date: 2024  Patient Name: Timothy Chacon  MRN: [id number]  : 1945    Chief Complaint   Patient presents with    Diabetes     Type 2 diabetes mellitus with hyperglycemia, with long-term current use of insulin       History of Present Illness:   Timothy Chacon is a 78 y.o. male who presents for Diabetes type 2. Diagnosed in: . Treated in past with oral agents. Current treatments: basal-bolus insulin, farxiga and ozempic. Number of insulin shots per day: 4. Checks blood sugar 288 times a day - on DexCom. Has low blood sugar: occasional. Aspirin use: No - on eliquis. Statin use: Yes. ACE-I/ARB use: No - due to hypotension. Changes in health since last visit: none. Last eye exam: summer 2023     Subjective      Diabetic Complications:  Eyes: Yes - BDR  Kidneys: Yes -  sees nephrology  Feet: No  Heart: No    Diet and Exercise:  Meals per day: 2  Minutes of exercise per week: 0 mins.    Review of Systems:   Review of Systems   Constitutional: Negative.    Cardiovascular: Negative.    Gastrointestinal: Negative.    Endocrine: Negative.        The following portions of the patient's history were reviewed and updated as appropriate: allergies, current medications, past family history, past medical history, past social history, past surgical history, and problem list.    Objective     Visit Vitals  /60 (BP Location: Left arm, Patient Position: Sitting, Cuff Size: Adult)   Pulse 67   Ht 175.3 cm (69\")   Wt 91.6 kg (202 lb)   SpO2 95%   BMI 29.83 kg/m²       Physical Exam:  Physical Exam  Constitutional:       Appearance: Normal appearance.   Cardiovascular:      Pulses:           Dorsalis pedis pulses are 2+ on the right side and 2+ on the left side.        Posterior tibial pulses are 2+ on the right side and 2+ on the left side.   Feet:      Right foot:      Protective Sensation: 5 sites tested.  5 sites sensed.      Skin integrity: Dry skin present.      Toenail Condition: Right toenails are " "abnormally thick. Fungal disease present.     Left foot:      Protective Sensation: 5 sites tested.  5 sites sensed.      Skin integrity: Dry skin present.      Toenail Condition: Left toenails are abnormally thick. Fungal disease present.  Neurological:      Mental Status: He is alert.         Labs:    HbA1c  Lab Results   Component Value Date    HGBA1C 7.5 08/02/2023       CMP  Lab Results   Component Value Date    GLUCOSE 44 (C) 12/10/2021    BUN 32 (H) 12/10/2021    CREATININE 1.89 (H) 12/10/2021    EGFRIFNONA 35 (L) 12/10/2021    BCR 16.9 12/10/2021    K 4.8 12/10/2021    CO2 25.3 12/10/2021    CALCIUM 9.9 12/10/2021    AST 18 12/10/2021    ALT 10 12/10/2021        Lipid Panel  Lab Results   Component Value Date    HDL 36 (L) 12/10/2021    LDL 76 12/10/2021    TRIG 93 12/10/2021        TSH  Lab Results   Component Value Date    TSH 1.890 12/10/2021        Hemoglobin A1C  Lab Results   Component Value Date    HGBA1C 7.5 08/02/2023        Microalbumin/Creatinine  No results found for: \"MALBCRERATIO\", \"CREATINIURIN\", \"MICROALBUR\"        Assessment / Plan      Assessment & Plan:  Diagnoses and all orders for this visit:    1. Type 2 diabetes mellitus with hyperglycemia, with long-term current use of insulin (Primary)  Assessment & Plan:  Diabetes is unchanged.  A1c acceptable at 7.5%.  Continue current treatment regimen.  Diabetes will be reassessed in 3 months.    DexCom G6 was downloaded today.  Data was reviewed from 1/25/24 to 2/7/24.  This showed good overnight glucose control.  Having some postprandial spikes.  He admits to taking his mealtime insulin late after eating.  We discussed timing of insulin with meals.      Orders:  -     Cancel: POC Glycosylated Hemoglobin (Hb A1C)  -     POC Glucose, Blood    2. Type 2 diabetes mellitus with hypoglycemia without coma, with long-term current use of insulin  Assessment & Plan:  Continue CGM.      3. Benign hypertension  Assessment & Plan:  Hypertension is " unchanged.  Continue current treatment regimen.  Blood pressure will be reassessed at the next regular appointment.      4. Mixed hyperlipidemia  Assessment & Plan:  Continue statin and fenofibrate.      5. Type 2 diabetes mellitus with diabetic nephropathy, with long-term current use of insulin  Assessment & Plan:  Continue SGLT-2 inhibitor.  Continue nephrology follow up.        Current Outpatient Medications   Medication Instructions    carvedilol (COREG) 12.5 MG tablet 1 tablet, 2 times daily    Cholecalciferol (Vitamin D) 50 MCG (2000 UT) capsule Oral    Continuous Blood Gluc  (Dexcom G6 ) device Does not apply    Continuous Blood Gluc  (Dexcom G7 ) device Does not apply    Continuous Blood Gluc Transmit (Dexcom G6 Transmitter) misc Does not apply    doxazosin (CARDURA) 8 MG tablet 1 tablet, Daily    Eliquis 5 mg, Oral, 2 Times Daily    Farxiga 10 mg, Oral, Daily    fenofibrate 160 MG tablet 1 tablet, Daily    finasteride (PROSCAR) 5 MG tablet 1 tablet, Daily    flecainide (TAMBOCOR) 50 MG tablet 1 tablet, 1 in am, 1 in pm    glucose blood (Accu-Chek Guide) test strip Testing 1 time per day PRN along with CGM; E11.65, Z79.4    loratadine (CLARITIN) 10 mg, Oral, Daily    metOLazone (ZAROXOLYN) 2.5 MG tablet 1 tablet, Every Other Day    montelukast (SINGULAIR) 10 MG tablet 1 tablet, Daily    NIFEdipine XL (PROCARDIA XL) 30 mg, Daily    NovoFine Plus Pen Needle 32G X 4 MM misc Use 4 per day    NovoLOG FlexPen 100 UNIT/ML solution pen-injector sc pen Inject up to 14 units TID with meals plus correction 1 unit per 50 over 150; max 40 units daily    rosuvastatin (CRESTOR) 40 mg, Oral, Daily    Semaglutide, 1 MG/DOSE, (Ozempic, 1 MG/DOSE,) 2 MG/1.5ML solution pen-injector Inject 1 mg weekly    spironolactone (ALDACTONE) 50 mg, Oral, Daily    Tresiba FlexTouch 100 UNIT/ML solution pen-injector injection Inject daily, titrate up to max 40 units daily      Return in about 3 months (around  5/7/2024) for Recheck with A1c.    Zac Fish MD   02/07/2024

## 2024-04-05 ENCOUNTER — TELEPHONE (OUTPATIENT)
Dept: ENDOCRINOLOGY | Facility: CLINIC | Age: 79
End: 2024-04-05
Payer: MEDICARE

## 2024-04-05 NOTE — TELEPHONE ENCOUNTER
Pt assistance is here  readdy for , ozempic, tresbia, and pen needles. Pt verbalized understanding

## 2024-06-11 ENCOUNTER — OFFICE VISIT (OUTPATIENT)
Dept: ENDOCRINOLOGY | Facility: CLINIC | Age: 79
End: 2024-06-11
Payer: MEDICARE

## 2024-06-11 VITALS
DIASTOLIC BLOOD PRESSURE: 64 MMHG | HEART RATE: 65 BPM | WEIGHT: 206 LBS | OXYGEN SATURATION: 98 % | HEIGHT: 69 IN | BODY MASS INDEX: 30.51 KG/M2 | SYSTOLIC BLOOD PRESSURE: 124 MMHG

## 2024-06-11 DIAGNOSIS — E11.65 TYPE 2 DIABETES MELLITUS WITH HYPERGLYCEMIA, WITH LONG-TERM CURRENT USE OF INSULIN: Primary | ICD-10-CM

## 2024-06-11 DIAGNOSIS — I10 BENIGN HYPERTENSION: ICD-10-CM

## 2024-06-11 DIAGNOSIS — Z79.4 TYPE 2 DIABETES MELLITUS WITH HYPERGLYCEMIA, WITH LONG-TERM CURRENT USE OF INSULIN: Primary | ICD-10-CM

## 2024-06-11 LAB
EXPIRATION DATE: ABNORMAL
GLUCOSE BLDC GLUCOMTR-MCNC: 140 MG/DL (ref 70–130)
HBA1C MFR BLD: 7.7 %
Lab: ABNORMAL

## 2024-06-11 PROCEDURE — 3078F DIAST BP <80 MM HG: CPT | Performed by: PHYSICIAN ASSISTANT

## 2024-06-11 PROCEDURE — 99214 OFFICE O/P EST MOD 30 MIN: CPT | Performed by: PHYSICIAN ASSISTANT

## 2024-06-11 PROCEDURE — 95251 CONT GLUC MNTR ANALYSIS I&R: CPT | Performed by: PHYSICIAN ASSISTANT

## 2024-06-11 PROCEDURE — 1159F MED LIST DOCD IN RCRD: CPT | Performed by: PHYSICIAN ASSISTANT

## 2024-06-11 PROCEDURE — 3074F SYST BP LT 130 MM HG: CPT | Performed by: PHYSICIAN ASSISTANT

## 2024-06-11 PROCEDURE — 1160F RVW MEDS BY RX/DR IN RCRD: CPT | Performed by: PHYSICIAN ASSISTANT

## 2024-06-11 NOTE — PROGRESS NOTES
"     Office Note      Date: 2024  Patient Name: Timothy Chacon  MRN: [id number]  : 1945    Chief Complaint   Patient presents with    Diabetes     Type II       History of Present Illness:   Timothy Chacon is a 78 y.o. male who presents for follow-up for type 2 diabetes diagnosed in .  He remains on Ozempic 1 mg weekly, Farxiga 10 mg daily, Tresiba 26 units daily and NovoLog 8 units with breakfast, 10 with lunch and supper plus an additional 1 unit for every 50 mg/dL his blood glucose is above 150 mg/dL.  He gets his insulin and Ozempic through patient assistance.  He continues to wear the Dexcom continuous glucose monitor and this device works well for him.  He reports he often has no routine with his meals and takes his insulin after he eats when his blood glucose is elevated.  He reports he worries about hypoglycemia if he takes this prior to eating or right after he eats.  He denies any severe or frequent hypoglycemia.  He is up-to-date on his eye exam he will be due for his appointment in July.  Dr. Fish did his foot exam at his appointment in February.  He continues to see nephrology.      Subjective     Review of Systems:   Review of Systems   Constitutional: Negative.    Cardiovascular: Negative.    Gastrointestinal: Negative.    Endocrine: Negative.    Neurological: Negative.        The following portions of the patient's history were reviewed and updated as appropriate: allergies, current medications, past family history, past medical history, past social history, past surgical history, and problem list.    Objective     Vitals:    24 1202   BP: 124/64   BP Location: Left arm   Patient Position: Sitting   Cuff Size: Adult   Pulse: 65   SpO2: 98%   Weight: 93.4 kg (206 lb)   Height: 175.3 cm (69\")     Body mass index is 30.42 kg/m².    Physical Exam  Vitals reviewed.   Constitutional:       General: He is not in acute distress.     Appearance: Normal appearance.   Neurological:      Mental " Status: He is alert.         HEMOGLOBIN A1C  Lab Results   Component Value Date    HGBA1C 7.7 05/28/2024       GLUCOSE  Glucose   Date Value Ref Range Status   06/11/2024 140 (A) 70 - 130 mg/dL Final             Assessment / Plan      Assessment & Plan:  1. Type 2 diabetes mellitus with hyperglycemia, with long-term current use of insulin  His hemoglobin A1c last month with his primary care physician was 7.7%.  This is up some as compared to February.  I reviewed his Dexcom download his average glucose is 197 mg/dL with 47% of his readings within range, 31% high, 21% very high less than 1% low and less than 1% very low.  His blood sugars during the day look okay but his blood sugars do tend to spike at night.  We discussed taking some insulin when he is eating his meal to prevent the spike.  He will consider this.  - POC Glucose, Blood    2. Benign hypertension  His blood pressure is okay today.  He will continue his current medications.      Return in about 4 months (around 10/11/2024) for Recheckokay to see me or Dr. Zac Merlos.     This note was dictated using Dragon voice recognition.    Electronically signed by: SHERWIN Glez  06/11/2024

## 2024-07-16 ENCOUNTER — TELEPHONE (OUTPATIENT)
Dept: ENDOCRINOLOGY | Facility: CLINIC | Age: 79
End: 2024-07-16
Payer: MEDICARE

## 2024-07-16 NOTE — TELEPHONE ENCOUNTER
OZEMPIC,NOVOLOG AND PEN NEEDLES. Spoke with patient to let them know thier patient assistance is ready for .

## 2024-08-14 ENCOUNTER — TELEPHONE (OUTPATIENT)
Dept: ENDOCRINOLOGY | Facility: CLINIC | Age: 79
End: 2024-08-14
Payer: MEDICARE

## 2024-08-14 NOTE — TELEPHONE ENCOUNTER
Called patient to let him know his pen needles from [employer] has been up front since July and see if he will pick them up. No answer, left vm to call back.

## 2024-10-16 ENCOUNTER — TELEPHONE (OUTPATIENT)
Dept: ENDOCRINOLOGY | Facility: CLINIC | Age: 79
End: 2024-10-16
Payer: MEDICARE

## 2024-10-17 ENCOUNTER — OFFICE VISIT (OUTPATIENT)
Dept: ENDOCRINOLOGY | Facility: CLINIC | Age: 79
End: 2024-10-17
Payer: MEDICARE

## 2024-10-17 VITALS
SYSTOLIC BLOOD PRESSURE: 122 MMHG | HEIGHT: 69 IN | BODY MASS INDEX: 30.07 KG/M2 | WEIGHT: 203 LBS | DIASTOLIC BLOOD PRESSURE: 60 MMHG | HEART RATE: 60 BPM | OXYGEN SATURATION: 100 %

## 2024-10-17 DIAGNOSIS — E11.65 TYPE 2 DIABETES MELLITUS WITH HYPERGLYCEMIA, WITH LONG-TERM CURRENT USE OF INSULIN: Primary | ICD-10-CM

## 2024-10-17 DIAGNOSIS — I10 BENIGN HYPERTENSION: ICD-10-CM

## 2024-10-17 DIAGNOSIS — Z79.4 TYPE 2 DIABETES MELLITUS WITH HYPERGLYCEMIA, WITH LONG-TERM CURRENT USE OF INSULIN: Primary | ICD-10-CM

## 2024-10-17 LAB
EXPIRATION DATE: ABNORMAL
EXPIRATION DATE: ABNORMAL
GLUCOSE BLDC GLUCOMTR-MCNC: 151 MG/DL (ref 70–130)
HBA1C MFR BLD: 7.8 % (ref 4.5–5.7)
Lab: ABNORMAL
Lab: ABNORMAL

## 2024-10-17 RX ORDER — ROSUVASTATIN CALCIUM 40 MG/1
40 TABLET, COATED ORAL DAILY
COMMUNITY

## 2024-10-17 NOTE — PROGRESS NOTES
"     Office Note      Date: 10/17/2024  Patient Name: Timothy Chacon  MRN: [id number]  : 1945    Chief Complaint   Patient presents with    Diabetes     Type II       History of Present Illness:   Timothy Chacon is a 78 y.o. male who presents for follow-up for type 2 diabetes diagnosed in .  He remains on Ozempic 1 mg weekly, Farxiga 10 mg daily, Tresiba 26 units daily and NovoLog up to 10 units 3 times a day with meals depending on his blood glucose reading.  He gets most of his diabetes medications through patient assistance.  He continues to use the Dexcom continuous glucose monitor.  He reports overall his blood sugars are pretty good overnight he occasionally notes hypoglycemia and tend to spike during the day.  He often does not take his NovoLog until his blood glucose is already elevated.  He does not take this prior to eating.  He is up-to-date on his eye exam he had his appointment in  and all was okay.  He denies any trouble with his feet today.  We did his foot exam at his appointment in February.  He continues to see nephrology and cardiology every 6 months and they typically send labs here for review.  His last labs were completed in February.      Subjective     Review of Systems:   Review of Systems   Constitutional: Negative.    Cardiovascular: Negative.    Gastrointestinal: Negative.    Endocrine: Negative.    Musculoskeletal:  Positive for arthralgias and myalgias.   Neurological: Negative.        The following portions of the patient's history were reviewed and updated as appropriate: allergies, current medications, past family history, past medical history, past social history, past surgical history, and problem list.    Objective     Vitals:    10/17/24 1206   BP: 122/60   BP Location: Left arm   Patient Position: Sitting   Cuff Size: Adult   Pulse: 60   SpO2: 100%   Weight: 92.1 kg (203 lb)   Height: 175.3 cm (69\")     Body mass index is 29.98 kg/m².    Physical Exam  Vitals reviewed. "   Constitutional:       General: He is not in acute distress.     Appearance: Normal appearance.   Neurological:      Mental Status: He is alert.         HEMOGLOBIN A1C  Lab Results   Component Value Date    HGBA1C 7.8 (A) 10/17/2024       GLUCOSE  Glucose   Date Value Ref Range Status   10/17/2024 151 (A) 70 - 130 mg/dL Final             Assessment / Plan      Assessment & Plan:  1. Type 2 diabetes mellitus with hyperglycemia, with long-term current use of insulin  His hemoglobin A1c today is slightly up as compared to June at 7.8%.  I reviewed his Dexcom download.  His average glucose is 183 mg/dL with 55% of his readings within range, 28% high, 16% very high, 1% low and 0% very low.  His blood glucose readings are good overnight and in the morning hours but do tend to go up in the afternoon and evening.  We discussed taking NovoLog prior to eating his first meal to prevent hyperglycemia.  He will work on this.  For now he will continue Ozempic 1 mg weekly, Farxiga 10 mg daily, Tresiba 26 units daily and NovoLog up to 14 units with meals.  - POC Glucose, Blood  - POC Glycosylated Hemoglobin (Hb A1C)    2. Benign hypertension  His blood pressure is okay today.  He will continue his current medications.  He will have a copy of his labs sent here for review from his nephrologist.      Return in about 4 months (around 2/17/2025) for Recheck.     This note was dictated using Dragon voice recognition.    Electronically signed by: SHERWIN Glez  10/17/2024

## 2024-10-18 LAB — Lab: 0.6

## 2025-02-24 ENCOUNTER — OFFICE VISIT (OUTPATIENT)
Dept: ENDOCRINOLOGY | Facility: CLINIC | Age: 80
End: 2025-02-24
Payer: MEDICARE

## 2025-02-24 VITALS
SYSTOLIC BLOOD PRESSURE: 126 MMHG | WEIGHT: 203 LBS | OXYGEN SATURATION: 98 % | HEART RATE: 65 BPM | HEIGHT: 69 IN | DIASTOLIC BLOOD PRESSURE: 62 MMHG | BODY MASS INDEX: 30.07 KG/M2

## 2025-02-24 DIAGNOSIS — E11.65 TYPE 2 DIABETES MELLITUS WITH HYPERGLYCEMIA, WITH LONG-TERM CURRENT USE OF INSULIN: Primary | ICD-10-CM

## 2025-02-24 DIAGNOSIS — Z79.4 TYPE 2 DIABETES MELLITUS WITH HYPERGLYCEMIA, WITH LONG-TERM CURRENT USE OF INSULIN: Primary | ICD-10-CM

## 2025-02-24 DIAGNOSIS — I10 BENIGN HYPERTENSION: ICD-10-CM

## 2025-02-24 LAB
EXPIRATION DATE: ABNORMAL
EXPIRATION DATE: ABNORMAL
GLUCOSE BLDC GLUCOMTR-MCNC: 209 MG/DL (ref 70–130)
HBA1C MFR BLD: 8.3 % (ref 4.5–5.7)
Lab: ABNORMAL
Lab: ABNORMAL

## 2025-02-24 PROCEDURE — 83036 HEMOGLOBIN GLYCOSYLATED A1C: CPT | Performed by: PHYSICIAN ASSISTANT

## 2025-02-24 PROCEDURE — 82947 ASSAY GLUCOSE BLOOD QUANT: CPT | Performed by: PHYSICIAN ASSISTANT

## 2025-02-24 PROCEDURE — 1160F RVW MEDS BY RX/DR IN RCRD: CPT | Performed by: PHYSICIAN ASSISTANT

## 2025-02-24 PROCEDURE — 99214 OFFICE O/P EST MOD 30 MIN: CPT | Performed by: PHYSICIAN ASSISTANT

## 2025-02-24 PROCEDURE — 3052F HG A1C>EQUAL 8.0%<EQUAL 9.0%: CPT | Performed by: PHYSICIAN ASSISTANT

## 2025-02-24 PROCEDURE — 3078F DIAST BP <80 MM HG: CPT | Performed by: PHYSICIAN ASSISTANT

## 2025-02-24 PROCEDURE — 1159F MED LIST DOCD IN RCRD: CPT | Performed by: PHYSICIAN ASSISTANT

## 2025-02-24 PROCEDURE — 3074F SYST BP LT 130 MM HG: CPT | Performed by: PHYSICIAN ASSISTANT

## 2025-02-24 NOTE — PROGRESS NOTES
Office Note      Date: 2025  Patient Name: Timothy Chacon  MRN: [id number]  : 1945    Chief Complaint   Patient presents with    Diabetes     Type 2 diabetes mellitus with hyperglycemia, with long-term current use of insulin       History of Present Illness:   Timothy Chacon is a 79 y.o. male who presents for follow-up for type 2 diabetes diagnosed in .  He continues to use the Dexcom continuous glucose monitor.  He continues Ozempic 1 mg weekly, Farxiga 10 mg daily, Tresiba 26 units daily and NovoLog up to 10 units 3 times a day with meals.  He often continues to take his NovoLog after his blood glucose is elevated and his Dexcom alerts him.  He gets his insulin and Ozempic through patient assistance.  He reports he thought he was doing well but was surprised by his hemoglobin A1c.  He did not expect it to be so high.  He does tend to have higher blood sugars later in the afternoons.  He has had a few low blood sugars since last visit but denies severe or frequent hypoglycemia.  He is up-to-date on his eye exam he goes annually in .  He denies any trouble with his feet today.  He does see a podiatrist annually and has an appointment in April.  He continues to see nephrology and cardiology every 6 months.  He had his colonoscopy a week ago and all was clear.  He reports he has labs coming up with his primary care physician next month.  He will have a copy sent here for review.         Subjective     Review of Systems:   Review of Systems   Constitutional: Negative.    Cardiovascular: Negative.    Gastrointestinal: Negative.    Endocrine: Negative.        The following portions of the patient's history were reviewed and updated as appropriate: allergies, current medications, past family history, past medical history, past social history, past surgical history, and problem list.    Objective     Vitals:    25 1353   BP: 126/62   BP Location: Left arm   Patient Position: Sitting   Cuff Size: Adult  "  Pulse: 65   SpO2: 98%   Weight: 92.1 kg (203 lb)   Height: 175.3 cm (69\")     Body mass index is 29.98 kg/m².    Physical Exam  Vitals reviewed.   Constitutional:       General: He is not in acute distress.     Appearance: Normal appearance.   Neurological:      Mental Status: He is alert.         HEMOGLOBIN A1C  Lab Results   Component Value Date    HGBA1C 8.3 (A) 02/24/2025       GLUCOSE  Glucose   Date Value Ref Range Status   02/24/2025 209 (A) 70 - 130 mg/dL Final         Assessment / Plan      Assessment & Plan:  1. Type 2 diabetes mellitus with hyperglycemia, with long-term current use of insulin  His hemoglobin A1c today is higher than it was in October and above our goal.  At 8.3%.  I reviewed his Dexcom download.  His average glucose for the last 2 weeks is 195 mg/dL with 50% of his readings within range, 25% high, 24% very high, 1% low and less than 1% very low.  I encouraged him to try to remember to take his NovoLog right after he is eating instead of waiting until his blood glucose spikes.  He has had some low blood glucose readings overnight so we will reduce his Tresiba to 24 units daily.  He will continue his Farxiga 10 mg daily and his Ozempic 1 mg weekly.  I gave him the [employer] patient assistance paperwork this afternoon.  He will bring this back to us once he gets his taxes completed.  His weight is stable.  I encouraged healthy eating habits and physical activity as tolerated.  He will have a copy of his upcoming blood work sent here for review.  - POC Glucose, Blood  - POC Glycosylated Hemoglobin (Hb A1C)    2. Benign hypertension  His blood pressure is okay today.  He will continue his current medications.    Return in about 4 months (around 6/24/2025) for Recheck.     This note was dictated using Dragon voice recognition.    Electronically signed by: SHERWIN Glez  02/24/2025  "

## 2025-04-15 ENCOUNTER — TELEPHONE (OUTPATIENT)
Dept: ENDOCRINOLOGY | Facility: CLINIC | Age: 80
End: 2025-04-15
Payer: MEDICARE

## 2025-06-09 ENCOUNTER — TELEPHONE (OUTPATIENT)
Dept: ENDOCRINOLOGY | Facility: CLINIC | Age: 80
End: 2025-06-09

## 2025-06-09 NOTE — TELEPHONE ENCOUNTER
Caller: Timothy Chacon    Relationship: Self    Best call back number: [phone]    What was the call regarding: PT CALLED NEEDING A CALL BACK [facility] REGARDING PATIENT ASSISTANCE. PLEASE ADVISE.

## 2025-06-09 NOTE — TELEPHONE ENCOUNTER
Spoke with patient.  States he has not received his Ozempic.  Called and spoke with [employer].  They approved the Ozempic and state it will ship within the next 10-14 days.  Patient notified.

## 2025-06-26 ENCOUNTER — TELEPHONE (OUTPATIENT)
Dept: ENDOCRINOLOGY | Facility: CLINIC | Age: 80
End: 2025-06-26
Payer: MEDICARE

## 2025-07-03 ENCOUNTER — TELEPHONE (OUTPATIENT)
Dept: ENDOCRINOLOGY | Facility: CLINIC | Age: 80
End: 2025-07-03

## 2025-07-03 NOTE — TELEPHONE ENCOUNTER
Hub staff attempted to follow warm transfer process and was unsuccessful     Caller: Timothy Chacon    Relationship to patient: Self    Best call back number: [phone]    Patient is needing: PT CALLING TO ADVISE THAT HE WILL BE THE OFFICE TODAY TO  HIS MEDICATION BEFORE THE OFFICE CLOSES

## 2025-07-14 ENCOUNTER — OFFICE VISIT (OUTPATIENT)
Dept: ENDOCRINOLOGY | Facility: CLINIC | Age: 80
End: 2025-07-14
Payer: MEDICARE

## 2025-07-14 VITALS
BODY MASS INDEX: 29.98 KG/M2 | HEIGHT: 69 IN | OXYGEN SATURATION: 99 % | SYSTOLIC BLOOD PRESSURE: 124 MMHG | DIASTOLIC BLOOD PRESSURE: 60 MMHG | HEART RATE: 60 BPM

## 2025-07-14 DIAGNOSIS — I10 BENIGN HYPERTENSION: ICD-10-CM

## 2025-07-14 DIAGNOSIS — Z79.4 TYPE 2 DIABETES MELLITUS WITH HYPERGLYCEMIA, WITH LONG-TERM CURRENT USE OF INSULIN: Primary | ICD-10-CM

## 2025-07-14 DIAGNOSIS — E11.65 TYPE 2 DIABETES MELLITUS WITH HYPERGLYCEMIA, WITH LONG-TERM CURRENT USE OF INSULIN: Primary | ICD-10-CM

## 2025-07-14 LAB
EXPIRATION DATE: ABNORMAL
EXPIRATION DATE: NORMAL
GLUCOSE BLDC GLUCOMTR-MCNC: 122 MG/DL (ref 70–130)
HBA1C MFR BLD: 7.5 % (ref 4.5–5.7)
Lab: ABNORMAL
Lab: NORMAL

## 2025-07-14 PROCEDURE — 1159F MED LIST DOCD IN RCRD: CPT | Performed by: PHYSICIAN ASSISTANT

## 2025-07-14 PROCEDURE — 83036 HEMOGLOBIN GLYCOSYLATED A1C: CPT | Performed by: PHYSICIAN ASSISTANT

## 2025-07-14 PROCEDURE — 3074F SYST BP LT 130 MM HG: CPT | Performed by: PHYSICIAN ASSISTANT

## 2025-07-14 PROCEDURE — 3051F HG A1C>EQUAL 7.0%<8.0%: CPT | Performed by: PHYSICIAN ASSISTANT

## 2025-07-14 PROCEDURE — 3078F DIAST BP <80 MM HG: CPT | Performed by: PHYSICIAN ASSISTANT

## 2025-07-14 PROCEDURE — 95251 CONT GLUC MNTR ANALYSIS I&R: CPT | Performed by: PHYSICIAN ASSISTANT

## 2025-07-14 PROCEDURE — 1160F RVW MEDS BY RX/DR IN RCRD: CPT | Performed by: PHYSICIAN ASSISTANT

## 2025-07-14 PROCEDURE — 99214 OFFICE O/P EST MOD 30 MIN: CPT | Performed by: PHYSICIAN ASSISTANT

## 2025-07-14 RX ORDER — ELECTROLYTES/DEXTROSE
1 SOLUTION, ORAL ORAL DAILY
COMMUNITY

## 2025-07-14 NOTE — PROGRESS NOTES
"     Office Note      Date: 2025  Patient Name: Timothy Chacon  MRN: [id number]  : 1945    Chief Complaint   Patient presents with    Diabetes     Type 2 diabetes mellitus with hyperglycemia, with long-term current use of insulin       History of Present Illness:   Timothy Chacon is a 79 y.o. male who presents for follow-up for type 2 diabetes diagnosed in .  He remains on Ozempic 1 mg weekly, Farxiga 10 mg daily, Tresiba 30 units daily and NovoLog 10 units 3 times a day with meals.  He gets his medication and insulin through patient assistance.  He reports he has been eating better this summer but his schedule has been less consistent over the summer months.  His granddaughter who is living with him will be going to [facility] in the fall and living on campus.  He continues to use the Dexcom continuous glucose monitor.  He reports he often takes his NovoLog after he eats because he will be out without his insulin.  He is up-to-date on his eye exam he had his appointment in .  He sees the podiatrist annually.  He has labs coming up with his nephrologist.  He sees his nephrologist and cardiologist regularly.      Subjective     Review of Systems:   Review of Systems   Constitutional: Negative.    Cardiovascular: Negative.    Gastrointestinal: Negative.    Endocrine: Negative.    Neurological: Negative.        The following portions of the patient's history were reviewed and updated as appropriate: allergies, current medications, past family history, past medical history, past social history, past surgical history, and problem list.    Objective     Vitals:    25 1459   BP: 124/60   BP Location: Left arm   Patient Position: Sitting   Cuff Size: Adult   Pulse: 60   SpO2: 99%   Height: 175.3 cm (69\")     Body mass index is 29.98 kg/m².    Physical Exam  Vitals reviewed.   Constitutional:       General: He is not in acute distress.     Appearance: Normal appearance.   Neurological:      Mental " Status: He is alert.         HEMOGLOBIN A1C  Lab Results   Component Value Date    HGBA1C 7.5 (A) 07/14/2025       GLUCOSE  Glucose   Date Value Ref Range Status   07/14/2025 122 70 - 130 mg/dL Final             Assessment / Plan      Assessment & Plan:  1. Type 2 diabetes mellitus with hyperglycemia, with long-term current use of insulin  His hemoglobin A1c has improved as compared to February at 7.5%.  This is acceptable.  I reviewed his Dexcom download.  His average glucose for the last 2 weeks is 175 mg/dL with 62% of his readings within target range, 24% high, 13% very high, 1% low and 0% very low.  We will not make any changes to his medications.  We discussed trying to remember to take his NovoLog prior to eating.  His weight is down 2 pounds since his appointment in February.  I encouraged healthy eating habits and physical activity as tolerated.  - POC Glucose, Blood  - POC Glycosylated Hemoglobin (Hb A1C)    2. Benign hypertension  His blood pressure is okay today.  He will continue his current medications.      Return in about 4 months (around 11/14/2025) for Recheck.     This note was dictated using Dragon voice recognition.    Electronically signed by: SHERWIN Glez  07/14/2025

## 2025-08-12 ENCOUNTER — TELEPHONE (OUTPATIENT)
Dept: ENDOCRINOLOGY | Facility: CLINIC | Age: 80
End: 2025-08-12
Payer: MEDICARE